# Patient Record
Sex: FEMALE | Race: WHITE | Employment: FULL TIME | URBAN - METROPOLITAN AREA
[De-identification: names, ages, dates, MRNs, and addresses within clinical notes are randomized per-mention and may not be internally consistent; named-entity substitution may affect disease eponyms.]

---

## 2017-11-29 ENCOUNTER — OFFICE VISIT (OUTPATIENT)
Dept: INTERNAL MEDICINE | Age: 37
End: 2017-11-29

## 2017-11-29 ENCOUNTER — TELEPHONE (OUTPATIENT)
Dept: INTERNAL MEDICINE | Age: 37
End: 2017-11-29

## 2017-11-29 ENCOUNTER — TELEPHONE (OUTPATIENT)
Dept: INTERNAL MEDICINE CLINIC | Age: 37
End: 2017-11-29

## 2017-11-29 VITALS
WEIGHT: 176 LBS | DIASTOLIC BLOOD PRESSURE: 70 MMHG | SYSTOLIC BLOOD PRESSURE: 120 MMHG | RESPIRATION RATE: 14 BRPM | HEART RATE: 64 BPM | BODY MASS INDEX: 31.18 KG/M2

## 2017-11-29 DIAGNOSIS — F51.01 PRIMARY INSOMNIA: ICD-10-CM

## 2017-11-29 DIAGNOSIS — R05.9 COUGH: ICD-10-CM

## 2017-11-29 DIAGNOSIS — Z72.0 TOBACCO ABUSE: ICD-10-CM

## 2017-11-29 DIAGNOSIS — M79.7 FIBROMYALGIA: Primary | ICD-10-CM

## 2017-11-29 DIAGNOSIS — J30.1 CHRONIC SEASONAL ALLERGIC RHINITIS DUE TO POLLEN: ICD-10-CM

## 2017-11-29 PROCEDURE — G8484 FLU IMMUNIZE NO ADMIN: HCPCS | Performed by: INTERNAL MEDICINE

## 2017-11-29 PROCEDURE — G8427 DOCREV CUR MEDS BY ELIG CLIN: HCPCS | Performed by: INTERNAL MEDICINE

## 2017-11-29 PROCEDURE — 99203 OFFICE O/P NEW LOW 30 MIN: CPT | Performed by: INTERNAL MEDICINE

## 2017-11-29 PROCEDURE — G8417 CALC BMI ABV UP PARAM F/U: HCPCS | Performed by: INTERNAL MEDICINE

## 2017-11-29 PROCEDURE — 4004F PT TOBACCO SCREEN RCVD TLK: CPT | Performed by: INTERNAL MEDICINE

## 2017-11-29 RX ORDER — MELOXICAM 15 MG/1
15 TABLET ORAL DAILY PRN
Qty: 30 TABLET | Refills: 5 | Status: SHIPPED | OUTPATIENT
Start: 2017-11-29 | End: 2018-09-24

## 2017-11-29 RX ORDER — PREGABALIN 75 MG/1
225 CAPSULE ORAL 2 TIMES DAILY
Qty: 180 CAPSULE | Refills: 0 | Status: SHIPPED | OUTPATIENT
Start: 2017-11-29 | End: 2017-12-25 | Stop reason: SDUPTHER

## 2017-11-29 RX ORDER — PREGABALIN 75 MG/1
225 CAPSULE ORAL 2 TIMES DAILY
Qty: 60 CAPSULE | Refills: 0 | Status: SHIPPED | OUTPATIENT
Start: 2017-11-29 | End: 2017-11-29 | Stop reason: SDUPTHER

## 2017-11-29 RX ORDER — AZITHROMYCIN 250 MG/1
TABLET, FILM COATED ORAL
Qty: 1 PACKET | Refills: 0 | Status: SHIPPED | OUTPATIENT
Start: 2017-11-29 | End: 2017-12-09

## 2017-11-29 RX ORDER — MELOXICAM 15 MG/1
1 TABLET ORAL DAILY PRN
Refills: 5 | COMMUNITY
Start: 2017-10-30 | End: 2017-11-29 | Stop reason: SDUPTHER

## 2017-11-29 RX ORDER — ZOLPIDEM TARTRATE 6.25 MG/1
6.25 TABLET, FILM COATED, EXTENDED RELEASE ORAL NIGHTLY PRN
Qty: 30 TABLET | Refills: 0 | Status: SHIPPED | OUTPATIENT
Start: 2017-11-29 | End: 2018-02-28

## 2017-11-29 NOTE — PROGRESS NOTES
PROGRESS NOTE:    Cata Bruno    11/29/2017    Chief Complaint   Patient presents with   Crista Jean New Doctor     HPI:    (s)Jose Manuel Bruno presents to clinic today with issues noted above. Hx of fibromyalgia and takes lyrica that helps also for sleep taking ambien. She feels her body hurts, sleeps ok with ambien, but uncertain if she sleeps all night, she is getting back to an exercise routine. Patient denies chest pain, SOB, NVD, FC, rash, malaise, rigor, dizziness/lightheadness, other pertinent ROS was also reviewed. /70   Pulse 64   Resp 14   Wt 176 lb (79.8 kg)   LMP 11/26/2017   BMI 31.18 kg/m²   Body mass index is 31.18 kg/m². Physical Exam:    Gen: Patient appears well groomed, well appearing  HEAD: Atraumatic, normocephalic,   Eyes: PERRLA, EOMI   Neck: supple, no thyroid nodule appreciated, no JVD  Chest: Clear to auscultation OLIVE, unlabored breathing, normal expansion  Heart: Regular rate, regular rhythm, no murmur, no rub  Abdomen: Non-tender, non-distended, bowel sounds present x3  Extremities: no edema, distal pulses intact  Patient was alert and oriented to person, place and time  Patient was examined for possibility of fibromyalgia. Trigger points assessed of proximal muscle regions including below clavicle region constochondral junctions, paraspinals throughout cervical/thoracic/lumbar region, lateral thigh and medial knee region. At least 11 trigger points noted. Lacie Khan was seen today for established new doctor. Diagnoses and all orders for this visit:    Fibromyalgia  I discussed with patient with regard to the diagnosis and prognosis, also management of fibromyalgia; which is multifaceted with exercise (i.e. Aerobic, water aerobics, resistance training etc.), magnesium and vitamin D supplementation as indicated, control of depression and improve sleep.       Primary insomnia   Refilled her [place]    Tobacco abuse   Patient is unfortunately still smoking, advised of quitting but not 100% ready. Informed about potential hazards with smoking such as cancer, also informed free cessation programs available for use. Consultation time >3 min. Chronic seasonal allergic rhinitis due to pollen  Daily zyrtec    Cough- ongoing, continue mucinex and zyrtec for congestion, late bacterial phase but she is recovering    No orders of the defined types were placed in this encounter. Prior to Admission medications    Medication Sig Start Date End Date Taking? Authorizing Provider   famotidine (PEPCID) 20 MG tablet Take 1 tablet by mouth 2 times daily 17  Yes Darwin Mcleod MD   zolpidem (AMBIEN CR) 6.25 MG extended release tablet Take 6.25 mg by mouth 17  Yes Historical Provider, MD   pregabalin (LYRICA) 75 MG capsule Take 225 mg by mouth 2 times daily 225 mg 2 x day   Yes Historical Provider, MD   meloxicam (MOBIC) 15 MG tablet Take 1 tablet by mouth daily as needed 10/30/17   Historical Provider, MD       Past Medical History:   Diagnosis Date    Anxiety     CHF (congestive heart failure) ([facility] [address]     pt states preg induced.     Chronic back pain     DDD (degenerative disc disease)     DDD (degenerative disc disease), cervical     DJD (degenerative joint disease), multiple sites     Fibromyalgia     GERD (gastroesophageal reflux disease)     Headache     Insomnia     Osteoarthritis     Tendinitis        Past Surgical History:   Procedure Laterality Date    CARPAL TUNNEL RELEASE Bilateral      SECTION      CHOLECYSTECTOMY      ELBOW SURGERY Left     cubital tunnel release    SPINE SURGERY      TONSILLECTOMY         Social History   Substance Use Topics    Smoking status: Current Every Day Smoker     Packs/day: 0.50     Years: 20.00     Types: Cigarettes    Smokeless tobacco: Never Used    Alcohol use No       Family History   Problem Relation Age of Onset    High Blood Pressure Mother     Heart Disease Mother     Arthritis Mother    [facility] Alcohol Abuse Mother     Heart Disease Maternal Grandmother     Arthritis Maternal Grandmother     Heart Disease Maternal Uncle

## 2017-11-29 NOTE — PATIENT INSTRUCTIONS
Patient Education        Fibromyalgia: Care Instructions  Your Care Instructions  Fibromyalgia is a painful condition that is not completely understood by medical experts. The cause of fibromyalgia is not known. It can make you feel tired and ache all over. It causes tender spots at specific points of the body that hurt only when you press on them. You may have trouble sleeping, as well as other symptoms. These problems can upset your work and home life. Symptoms tend to come and go, although they may never go away completely. Fibromyalgia does not harm your muscles, joints, or organs. Follow-up care is a key part of your treatment and safety. Be sure to make and go to all appointments, and call your doctor if you are having problems. It's also a good idea to know your test results and keep a list of the medicines you take. How can you care for yourself at home? · Exercise often. Walk, swim, or bike to help with pain and sleep problems and to make you feel better. · Try to get a good night's sleep. Go to bed and get up at the same time each day, whether you feel rested or not. Make sure you have a good mattress and pillow. · Reduce stress. Avoid things that cause you stress, if you can. If not, work at making them less stressful. Learn to use biofeedback, guided imagery, meditation, or other methods to relax. · Make healthy changes. Eat a balanced diet, quit smoking, and limit alcohol and caffeine. · Use a heating pad set on low or take warm baths or showers for pain. Using cold packs for up to 20 minutes at a time can also relieve pain. Put a thin cloth between the cold pack and your skin. A gentle massage might help too. · Be safe with medicines. Take your medicines exactly as prescribed. Call your doctor if you think you are having a problem with your medicine. Your doctor may talk to you about taking antidepressant medicines.  These medicines may improve sleep, relieve pain, and in some cases treat

## 2017-12-01 ENCOUNTER — TELEPHONE (OUTPATIENT)
Dept: INTERNAL MEDICINE | Age: 37
End: 2017-12-01

## 2017-12-29 ENCOUNTER — TELEPHONE (OUTPATIENT)
Dept: INTERNAL MEDICINE CLINIC | Age: 37
End: 2017-12-29

## 2017-12-29 NOTE — TELEPHONE ENCOUNTER
Phil Ramírez stated that they received rx for Ambien but they have not received rx for pt's Lyrica. Please call her at phone# provided.

## 2017-12-29 NOTE — TELEPHONE ENCOUNTER
Pt was advised of the [facility] message and said \"but he refilled it the last time and told me to call when its time for my refill. So why would he give it to me then and all of a sudden stop giving me the medication? \" pt also is looking for her refill of LYRICA 75 MG capsule [[id number]      [place] [address] [address] [facility] [address] [facility] [phone] - [phone]

## 2017-12-29 NOTE — TELEPHONE ENCOUNTER
Provider is out of office today but is checking messages, Will not receive script until seen on Wednesday per Dr Mayela Payne.

## 2017-12-29 NOTE — TELEPHONE ENCOUNTER
Pt would like a call back to see why Dr. Tawanna Mayorga will no longer giver her the Ambien anymore when she has been on it for over 3 yrs.

## 2018-01-03 ENCOUNTER — OFFICE VISIT (OUTPATIENT)
Dept: INTERNAL MEDICINE CLINIC | Age: 38
End: 2018-01-03

## 2018-01-03 VITALS — SYSTOLIC BLOOD PRESSURE: 110 MMHG | HEART RATE: 72 BPM | DIASTOLIC BLOOD PRESSURE: 72 MMHG | RESPIRATION RATE: 14 BRPM

## 2018-01-03 DIAGNOSIS — F51.01 PRIMARY INSOMNIA: Primary | ICD-10-CM

## 2018-01-03 DIAGNOSIS — M54.12 CERVICAL RADICULOPATHY: ICD-10-CM

## 2018-01-03 PROCEDURE — 99213 OFFICE O/P EST LOW 20 MIN: CPT | Performed by: INTERNAL MEDICINE

## 2018-01-03 PROCEDURE — G8427 DOCREV CUR MEDS BY ELIG CLIN: HCPCS | Performed by: INTERNAL MEDICINE

## 2018-01-03 PROCEDURE — 4004F PT TOBACCO SCREEN RCVD TLK: CPT | Performed by: INTERNAL MEDICINE

## 2018-01-03 PROCEDURE — G8484 FLU IMMUNIZE NO ADMIN: HCPCS | Performed by: INTERNAL MEDICINE

## 2018-01-03 PROCEDURE — G8417 CALC BMI ABV UP PARAM F/U: HCPCS | Performed by: INTERNAL MEDICINE

## 2018-01-03 RX ORDER — NORTRIPTYLINE HYDROCHLORIDE 25 MG/1
25 CAPSULE ORAL NIGHTLY
Qty: 30 CAPSULE | Refills: 0 | Status: SHIPPED | OUTPATIENT
Start: 2018-01-03 | End: 2018-02-28 | Stop reason: ALTCHOICE

## 2018-01-03 NOTE — PROGRESS NOTES
VIEWS); Future    Hx of bipolar disease- will refer her to psychiatry, she was amendable to this today and states she will follow up      Orders Placed This Encounter   Procedures    XR CERVICAL SPINE (2-3 VIEWS)   [id number]       Prior to Admission medications    Medication Sig Start Date End Date Taking? Authorizing Provider   LYRICA 75 MG capsule TAKE 3 CAPSULES BY MOUTH TWICE DAILY 17   Prentis Alt, DO   meloxicam (MOBIC) 15 MG tablet Take 1 tablet by mouth daily as needed (pain) 17   Prentis Alt, DO   zolpidem (AMBIEN CR) 6.25 MG extended release tablet Take 1 tablet by mouth nightly as needed for Sleep . 17   Prentis Alt, DO   famotidine (PEPCID) 20 MG tablet Take 1 tablet by mouth 2 times daily 17   Sandra Myers MD       Past Medical History:   Diagnosis Date    Anxiety     CHF (congestive heart failure) ([facility] [address]     pt states preg induced.     Chronic back pain     DDD (degenerative disc disease)     DDD (degenerative disc disease), cervical     DJD (degenerative joint disease), multiple sites     Fibromyalgia     GERD (gastroesophageal reflux disease)     Headache     Insomnia     Osteoarthritis     Tendinitis        Past Surgical History:   Procedure Laterality Date    CARPAL TUNNEL RELEASE Bilateral      SECTION      CHOLECYSTECTOMY      ELBOW SURGERY Left     cubital tunnel release    SPINE SURGERY      TONSILLECTOMY         Social History   Substance Use Topics    Smoking status: Current Every Day Smoker     Packs/day: 0.50     Years: 20.00     Types: Cigarettes    Smokeless tobacco: Never Used    Alcohol use No       Family History   Problem Relation Age of Onset    High Blood Pressure Mother     Heart Disease Mother     Arthritis Mother     Alcohol Abuse Mother     Heart Disease Maternal Grandmother     Arthritis Maternal Grandmother     Heart Disease Maternal Uncle

## 2018-01-24 DIAGNOSIS — M79.7 FIBROMYALGIA: ICD-10-CM

## 2018-01-24 RX ORDER — PREGABALIN 75 MG/1
CAPSULE ORAL
Qty: 180 CAPSULE | Refills: 0 | Status: SHIPPED | OUTPATIENT
Start: 2018-01-24 | End: 2018-02-20 | Stop reason: SDUPTHER

## 2018-01-24 RX ORDER — PREGABALIN 75 MG/1
CAPSULE ORAL
Qty: 180 CAPSULE | Refills: 0 | Status: CANCELLED | OUTPATIENT
Start: 2018-01-24

## 2018-01-29 ENCOUNTER — TELEPHONE (OUTPATIENT)
Dept: INTERNAL MEDICINE CLINIC | Age: 38
End: 2018-01-29

## 2018-01-30 NOTE — TELEPHONE ENCOUNTER
I really do not have anything else I can add since she has had \"side effects\" or \"ineffectiveness\" with all of the other sleep aid including Trazodone, melatonin, benadryl. The underlying sleep issue may be more related to depression/anxiety which she really should be seeing a psychiatrist for.

## 2018-01-31 ENCOUNTER — TELEPHONE (OUTPATIENT)
Dept: SLEEP MEDICINE | Age: 38
End: 2018-01-31

## 2018-01-31 NOTE — TELEPHONE ENCOUNTER
Left Dr Arvella Cockayne office a message to call back to see what is going on regarding the patient being schedule.

## 2018-01-31 NOTE — TELEPHONE ENCOUNTER
Sulaiman Hawthorne was referred to Sleep Medicine for Sleep Study. A message was left on the patient's voicemail to call the office back to schedule a consult.

## 2018-02-01 RX ORDER — QUETIAPINE FUMARATE 25 MG/1
25 TABLET, FILM COATED ORAL NIGHTLY PRN
Qty: 30 TABLET | Refills: 0 | Status: SHIPPED | OUTPATIENT
Start: 2018-02-01 | End: 2018-02-28 | Stop reason: ALTCHOICE

## 2018-02-03 ENCOUNTER — HOSPITAL ENCOUNTER (OUTPATIENT)
Dept: OTHER | Age: 38
Discharge: HOME | End: 2018-02-03
Attending: INTERNAL MEDICINE | Admitting: INTERNAL MEDICINE

## 2018-02-03 DIAGNOSIS — M54.12 CERVICAL RADICULOPATHY: ICD-10-CM

## 2018-02-20 DIAGNOSIS — M79.7 FIBROMYALGIA: ICD-10-CM

## 2018-02-20 NOTE — TELEPHONE ENCOUNTER
Reviewed patient chart and that was previous PCP at [facility] and she was dismissed from that practice. She currently is still seeing us .

## 2018-02-21 RX ORDER — PREGABALIN 75 MG/1
CAPSULE ORAL
Qty: 180 CAPSULE | Refills: 0 | Status: SHIPPED | OUTPATIENT
Start: 2018-02-21 | End: 2018-03-14 | Stop reason: SDUPTHER

## 2018-02-28 ENCOUNTER — OFFICE VISIT (OUTPATIENT)
Dept: INTERNAL MEDICINE CLINIC | Age: 38
End: 2018-02-28

## 2018-02-28 VITALS — DIASTOLIC BLOOD PRESSURE: 68 MMHG | HEART RATE: 88 BPM | RESPIRATION RATE: 14 BRPM | SYSTOLIC BLOOD PRESSURE: 102 MMHG

## 2018-02-28 DIAGNOSIS — J30.1 CHRONIC SEASONAL ALLERGIC RHINITIS DUE TO POLLEN: Primary | ICD-10-CM

## 2018-02-28 DIAGNOSIS — F51.04 PSYCHOPHYSIOLOGICAL INSOMNIA: ICD-10-CM

## 2018-02-28 PROCEDURE — 4004F PT TOBACCO SCREEN RCVD TLK: CPT | Performed by: INTERNAL MEDICINE

## 2018-02-28 PROCEDURE — G8427 DOCREV CUR MEDS BY ELIG CLIN: HCPCS | Performed by: INTERNAL MEDICINE

## 2018-02-28 PROCEDURE — G8417 CALC BMI ABV UP PARAM F/U: HCPCS | Performed by: INTERNAL MEDICINE

## 2018-02-28 PROCEDURE — 99213 OFFICE O/P EST LOW 20 MIN: CPT | Performed by: INTERNAL MEDICINE

## 2018-02-28 PROCEDURE — G8484 FLU IMMUNIZE NO ADMIN: HCPCS | Performed by: INTERNAL MEDICINE

## 2018-02-28 RX ORDER — FLUTICASONE PROPIONATE 50 MCG
1 SPRAY, SUSPENSION (ML) NASAL DAILY
Qty: 1 BOTTLE | Refills: 3 | Status: SHIPPED | OUTPATIENT
Start: 2018-02-28 | End: 2018-09-24

## 2018-02-28 RX ORDER — ZOLPIDEM TARTRATE 5 MG/1
5 TABLET ORAL NIGHTLY PRN
Qty: 14 TABLET | Refills: 0 | Status: SHIPPED | OUTPATIENT
Start: 2018-02-28 | End: 2018-03-14

## 2018-02-28 RX ORDER — CALCIUM CARBONATE 300MG(750)
1 TABLET,CHEWABLE ORAL DAILY
COMMUNITY
End: 2018-09-24

## 2018-02-28 NOTE — PROGRESS NOTES
daily    Preventive medicine: patient has denied indicated immunizations. No orders of the defined types were placed in this encounter. Prior to Admission medications    Medication Sig Start Date End Date Taking? Authorizing Provider   Cholecalciferol (VITAMIN D3) 1000 units CAPS Take 1 capsule by mouth 2 times daily   Yes Historical Provider, MD   Magnesium 400 MG TABS Take 1 tablet by mouth daily   Yes Historical Provider, MD   fluticasone (FLONASE) 50 MCG/ACT nasal spray 1 spray by Nasal route daily 18  Yes Sho Penta, DO   zolpidem (AMBIEN) 5 MG tablet Take 1 tablet by mouth nightly as needed for Sleep for up to 14 days. 2/28/18 3/14/18 Yes Apollo Maldonado, DO   pregabalin (LYRICA) 75 MG capsule TAKE 3 CAPSULES BY MOUTH TWICE DAILY. 18 Yes Sho Penta, DO   meloxicam (MOBIC) 15 MG tablet Take 1 tablet by mouth daily as needed (pain) 17  Yes Sho Penta, DO   famotidine (PEPCID) 20 MG tablet Take 1 tablet by mouth 2 times daily 17  Yes Elba Hull MD       Past Medical History:   Diagnosis Date    Anxiety     CHF (congestive heart failure) ([facility] [address]     pt states preg induced.     Chronic back pain     DDD (degenerative disc disease)     DDD (degenerative disc disease), cervical     DJD (degenerative joint disease), multiple sites     Fibromyalgia     GERD (gastroesophageal reflux disease)     Headache     Insomnia     Osteoarthritis     Tendinitis        Past Surgical History:   Procedure Laterality Date    CARPAL TUNNEL RELEASE Bilateral      SECTION      CHOLECYSTECTOMY      ELBOW SURGERY Left     cubital tunnel release    SPINE SURGERY      TONSILLECTOMY         Social History   Substance Use Topics    Smoking status: Current Every Day Smoker     Packs/day: 0.50     Years: 20.00     Types: Cigarettes    Smokeless tobacco: Never Used    Alcohol use No       Family History   Problem Relation Age of Onset    High Blood Pressure Mother     Heart

## 2018-02-28 NOTE — PATIENT INSTRUCTIONS
Patient Education        Using a Nasal Steroid Spray: Care Instructions  Your Care Instructions    Your doctor may suggest using a corticosteroid nasal spray for your allergy symptoms or sinus problems. These sprays reduce the swelling inside the nose and sinuses. Unlike decongestant nasal sprays, steroid sprays won't lead to more swelling when you stop taking them. These sprays start working in a few days, but it may take several weeks before you get the full effect. Most side effects are minor. The most common complaint is a burning feeling in the nose right after the spray is used. Some people get nosebleeds. Follow-up care is a key part of your treatment and safety. Be sure to make and go to all appointments, and call your doctor if you are having problems. It's also a good idea to know your test results and keep a list of the medicines you take. How can you care for yourself at home? Here are some tips for using these sprays:  · You may need to prime the sprayer before you use it. This means spraying it into the air a few times to make sure you get the right amount of medicine. Follow the directions on the label. · Blow your nose before you spray. This will help clear out your nostrils. · Gently sniff the medicine into your nose as you spray. Don't snort, or the medicine will go all the way into your throat where it won't do much good. · Aim the nozzle straight toward the outer wall of your nostril. This will help keep the medicine from irritating the inner walls of your nose, especially your septum (the wall that separates your left and right nostrils). · Don't blow your nose for 10 minutes or so after you spray. And try not to sneeze. · Be safe with medicines. Use this medicine exactly as prescribed. Call your doctor if you think you are having a problem with your medicine. · Clean your sprayer once a week. Read the label to learn how. When should you call for help?   Watch closely for changes

## 2018-03-09 ENCOUNTER — OFFICE VISIT (OUTPATIENT)
Dept: PSYCHOLOGY | Age: 38
End: 2018-03-09

## 2018-03-09 DIAGNOSIS — F43.21 ADJUSTMENT DISORDER WITH DEPRESSED MOOD: Primary | ICD-10-CM

## 2018-03-09 PROCEDURE — 90791 PSYCH DIAGNOSTIC EVALUATION: CPT | Performed by: PSYCHOLOGIST

## 2018-03-09 ASSESSMENT — PATIENT HEALTH QUESTIONNAIRE - PHQ9
SUM OF ALL RESPONSES TO PHQ QUESTIONS 1-9: 1
2. FEELING DOWN, DEPRESSED OR HOPELESS: 1
SUM OF ALL RESPONSES TO PHQ9 QUESTIONS 1 & 2: 1
1. LITTLE INTEREST OR PLEASURE IN DOING THINGS: 0

## 2018-03-12 ENCOUNTER — OFFICE VISIT (OUTPATIENT)
Dept: INTERNAL MEDICINE CLINIC | Age: 38
End: 2018-03-12

## 2018-03-12 VITALS
BODY MASS INDEX: 30.05 KG/M2 | HEIGHT: 64 IN | SYSTOLIC BLOOD PRESSURE: 104 MMHG | WEIGHT: 176 LBS | HEART RATE: 101 BPM | RESPIRATION RATE: 14 BRPM | DIASTOLIC BLOOD PRESSURE: 68 MMHG

## 2018-03-12 DIAGNOSIS — Z92.89 HISTORY OF EKG: ICD-10-CM

## 2018-03-12 DIAGNOSIS — Z01.818 PREOP EXAM FOR INTERNAL MEDICINE: ICD-10-CM

## 2018-03-12 DIAGNOSIS — L98.9 SKIN LESION: Primary | ICD-10-CM

## 2018-03-12 DIAGNOSIS — Z72.0 TOBACCO ABUSE: ICD-10-CM

## 2018-03-12 PROCEDURE — G8427 DOCREV CUR MEDS BY ELIG CLIN: HCPCS | Performed by: INTERNAL MEDICINE

## 2018-03-12 PROCEDURE — G8484 FLU IMMUNIZE NO ADMIN: HCPCS | Performed by: INTERNAL MEDICINE

## 2018-03-12 PROCEDURE — G8417 CALC BMI ABV UP PARAM F/U: HCPCS | Performed by: INTERNAL MEDICINE

## 2018-03-12 PROCEDURE — 99241 PR OFFICE CONSULTATION NEW/ESTAB PATIENT 15 MIN: CPT | Performed by: INTERNAL MEDICINE

## 2018-03-12 NOTE — PROGRESS NOTES
Pre-operative evaluation    3/12/2018    Lyla Briceno  [id number]      Mr. Lyla Briceno is here for a preoperative evaluation. Patient denies chest pain, SOB, NVD, FC, rash, malaise, rigor, dizziness/lightheadness, other pertinent ROS was also reviewed. She is planned for 3/16/2018 left axillary cysts removal.    /68   Pulse 101   Resp 14   Ht 5' 4\" (1.626 m)   Wt 176 lb (79.8 kg)   LMP 02/15/2018   BMI 30.21 kg/m²     Gen: Patient appears well groomed, well appearing  HEAD: Atraumatic, normocephalic,   Eyes: PERRLA, EOMI   Neck: supple, no thyroid nodule appreciated, no JVD  Chest: Clear to auscultation OLIVE, unlabored breathing, normal expansion  Heart: Regular rate, regular rhythm, no murmur, no rub  Abdomen: Non-tender, non-distended, bowel sounds present x3  Extremities: no edema, distal pulses intact  Patient was alert and oriented to person, place and time  Neuro: Alert and oriented to time, place, person, normal ambulation with good balance  CN II-XII intact grossly, hand  +5/5 R, +5/5 L  Motor: Left upper extremity strength [email or web address], ~5/5@elbow,    Right upper extremity strength [email or web address], ~5/5@elbow   Left  lower extremity strength [employer], ~5/5@knee, ~5/5@ankle   Right lower extremity strength [employer], ~5/5@knee, ~5/5@ankle  DTR's: R knee +2/4, R achilles +2/4   L knee +2/4, L achilles +2/4   L brachioradialis +2/4   R brachioradialis +2/4    Amelie Duque was seen today for pre-op exam.    Diagnoses and all orders for this visit:    Skin lesion  Left axillary cystic lesions planned for removal 3/16/2018. OA  Worst in knees, advised her to hold her Mobic at least 5 days prior to surgery    Smoking tobacco  Patient is unfortunately still smoking, advised of quitting but not 100% ready. Informed about potential hazards with smoking such as cancer, also informed free cessation programs available for use. Consultation time >3 min.      Preop exam for internal medicine  Preoperative evaluation- from a cardiovascular stand point, patient is at low to moderate risk for proposed operation, but the risk is acceptable. Risk of complications of any surgery, including but not limited to MI, CVA, and death was explained. From a medical perspective the proposed surgery is reasonable. Clear to proceed from a medical perspective, reviewed EKG from 2016, NSR. Current Outpatient Prescriptions on File Prior to Visit   Medication Sig Dispense Refill    Cholecalciferol (VITAMIN D3) 1000 units CAPS Take 1 capsule by mouth 2 times daily      Magnesium 400 MG TABS Take 1 tablet by mouth daily      fluticasone (FLONASE) 50 MCG/ACT nasal spray 1 spray by Nasal route daily 1 Bottle 3    zolpidem (AMBIEN) 5 MG tablet Take 1 tablet by mouth nightly as needed for Sleep for up to 14 days. 14 tablet 0    pregabalin (LYRICA) 75 MG capsule TAKE 3 CAPSULES BY MOUTH TWICE DAILY. 180 capsule 0    meloxicam (MOBIC) 15 MG tablet Take 1 tablet by mouth daily as needed (pain) 30 tablet 5    famotidine (PEPCID) 20 MG tablet Take 1 tablet by mouth 2 times daily 60 tablet 0     No current facility-administered medications on file prior to visit. Past Medical History:   Diagnosis Date    Anxiety     CHF (congestive heart failure) ([facility] [address]     pt states preg induced.     Chronic back pain     DDD (degenerative disc disease)     DDD (degenerative disc disease), cervical     DJD (degenerative joint disease), multiple sites     Fibromyalgia     GERD (gastroesophageal reflux disease)     Headache     Insomnia     Osteoarthritis     Tendinitis        Past Surgical History:   Procedure Laterality Date    CARPAL TUNNEL RELEASE Bilateral      SECTION      CHOLECYSTECTOMY      ELBOW SURGERY Left     cubital tunnel release    SPINE SURGERY      TONSILLECTOMY         Social History   Substance Use Topics    Smoking status: Current Every Day Smoker     Packs/day: 0.50     Years: 20.00     Types: Cigarettes    Smokeless tobacco: Never Used    Alcohol use No       Family History   Problem Relation Age of Onset    High Blood Pressure Mother     Heart Disease Mother     Arthritis Mother     Alcohol Abuse Mother     Heart Disease Maternal Grandmother     Arthritis Maternal Grandmother     Heart Disease Maternal Uncle

## 2018-03-14 ENCOUNTER — INITIAL CONSULT (OUTPATIENT)
Dept: PULMONOLOGY | Age: 38
End: 2018-03-14

## 2018-03-14 ENCOUNTER — OFFICE VISIT (OUTPATIENT)
Dept: PSYCHIATRY | Age: 38
End: 2018-03-14

## 2018-03-14 VITALS
DIASTOLIC BLOOD PRESSURE: 60 MMHG | HEART RATE: 101 BPM | BODY MASS INDEX: 30.39 KG/M2 | WEIGHT: 178 LBS | OXYGEN SATURATION: 98 % | SYSTOLIC BLOOD PRESSURE: 90 MMHG | HEIGHT: 64 IN

## 2018-03-14 VITALS
WEIGHT: 175 LBS | SYSTOLIC BLOOD PRESSURE: 110 MMHG | DIASTOLIC BLOOD PRESSURE: 60 MMHG | OXYGEN SATURATION: 95 % | HEIGHT: 64 IN | BODY MASS INDEX: 29.88 KG/M2 | HEART RATE: 103 BPM

## 2018-03-14 DIAGNOSIS — F31.9 BIPOLAR AFFECTIVE DISORDER, REMISSION STATUS UNSPECIFIED (HCC): Chronic | ICD-10-CM

## 2018-03-14 DIAGNOSIS — E66.9 NON MORBID OBESITY, UNSPECIFIED OBESITY TYPE: Chronic | ICD-10-CM

## 2018-03-14 DIAGNOSIS — R06.83 SNORING: ICD-10-CM

## 2018-03-14 DIAGNOSIS — G47.10 HYPERSOMNIA: Primary | ICD-10-CM

## 2018-03-14 DIAGNOSIS — G43.909 MIGRAINE WITHOUT STATUS MIGRAINOSUS, NOT INTRACTABLE, UNSPECIFIED MIGRAINE TYPE: Chronic | ICD-10-CM

## 2018-03-14 DIAGNOSIS — J30.1 CHRONIC SEASONAL ALLERGIC RHINITIS DUE TO POLLEN: Chronic | ICD-10-CM

## 2018-03-14 DIAGNOSIS — F43.20 ADJUSTMENT DISORDER, UNSPECIFIED TYPE: ICD-10-CM

## 2018-03-14 DIAGNOSIS — F51.04 INSOMNIA, PSYCHOPHYSIOLOGICAL: ICD-10-CM

## 2018-03-14 DIAGNOSIS — Z86.59 HISTORY OF BIPOLAR DISORDER: Primary | ICD-10-CM

## 2018-03-14 DIAGNOSIS — M79.7 FIBROMYALGIA: ICD-10-CM

## 2018-03-14 PROCEDURE — 99244 OFF/OP CNSLTJ NEW/EST MOD 40: CPT | Performed by: INTERNAL MEDICINE

## 2018-03-14 PROCEDURE — 99215 OFFICE O/P EST HI 40 MIN: CPT | Performed by: PSYCHIATRY & NEUROLOGY

## 2018-03-14 PROCEDURE — G8427 DOCREV CUR MEDS BY ELIG CLIN: HCPCS | Performed by: INTERNAL MEDICINE

## 2018-03-14 PROCEDURE — G8484 FLU IMMUNIZE NO ADMIN: HCPCS | Performed by: INTERNAL MEDICINE

## 2018-03-14 PROCEDURE — G8417 CALC BMI ABV UP PARAM F/U: HCPCS | Performed by: INTERNAL MEDICINE

## 2018-03-14 RX ORDER — PREGABALIN 75 MG/1
CAPSULE ORAL
Qty: 180 CAPSULE | Refills: 0 | Status: CANCELLED | OUTPATIENT
Start: 2018-03-14 | End: 2019-03-13

## 2018-03-14 RX ORDER — PREGABALIN 225 MG/1
225 CAPSULE ORAL 2 TIMES DAILY
Qty: 60 CAPSULE | Refills: 2 | Status: SHIPPED | OUTPATIENT
Start: 2018-03-14 | End: 2018-06-04 | Stop reason: SDUPTHER

## 2018-03-14 ASSESSMENT — ENCOUNTER SYMPTOMS
EYE PAIN: 0
CHEST TIGHTNESS: 0
ABDOMINAL DISTENTION: 0
ALLERGIC/IMMUNOLOGIC NEGATIVE: 1
SHORTNESS OF BREATH: 0
CHOKING: 0
PHOTOPHOBIA: 0
VOMITING: 0
RHINORRHEA: 0
ABDOMINAL PAIN: 0
APNEA: 0
NAUSEA: 0

## 2018-03-14 ASSESSMENT — SLEEP AND FATIGUE QUESTIONNAIRES
HOW LIKELY ARE YOU TO NOD OFF OR FALL ASLEEP WHILE SITTING QUIETLY AFTER LUNCH WITHOUT ALCOHOL: 1
ESS TOTAL SCORE: 5
HOW LIKELY ARE YOU TO NOD OFF OR FALL ASLEEP WHILE LYING DOWN TO REST IN THE AFTERNOON WHEN CIRCUMSTANCES PERMIT: 0
HOW LIKELY ARE YOU TO NOD OFF OR FALL ASLEEP WHILE SITTING AND TALKING TO SOMEONE: 1
HOW LIKELY ARE YOU TO NOD OFF OR FALL ASLEEP WHILE WATCHING TV: 1
HOW LIKELY ARE YOU TO NOD OFF OR FALL ASLEEP WHILE SITTING INACTIVE IN A PUBLIC PLACE: 0
HOW LIKELY ARE YOU TO NOD OFF OR FALL ASLEEP WHEN YOU ARE A PASSENGER IN A CAR FOR AN HOUR WITHOUT A BREAK: 0
HOW LIKELY ARE YOU TO NOD OFF OR FALL ASLEEP WHILE SITTING AND READING: 1
NECK CIRCUMFERENCE (INCHES): 13.5
HOW LIKELY ARE YOU TO NOD OFF OR FALL ASLEEP IN A CAR, WHILE STOPPED FOR A FEW MINUTES IN TRAFFIC: 1

## 2018-03-14 NOTE — PROGRESS NOTES
Margie Asencio MD, FAA, [facility]  [facility] [address]  3[address] Floor, [address] [facility], [address] [phone]   [address] [place] [address] [address] . [address] [phone]     [address] MEDICINE    Subjective:     Patient ID: Mariel Albarran is a 40 y.o. female. No chief complaint on file. HPI:        Mariel Albarran is a 40 y.o. female referred by Willie Ansari DO for a sleep evaluation. She complains of snoring, tossing and turning, decreased concentration, excessive daytime sleepiness, difficulty falling asleep once awakened, feels sleepy during the day but she denies knees buckling with laughing, completely or partially paralyzed while falling asleep or waking up. Symptoms began >10 years ago, gradually worsening since that time. Has been tried on belsomra, trazodone, lunesta, zolpidem, elavil, seroquel, and pamelor. Most meds do not help or will not keep her asleep. Pt's sleep pattern:  Attempt bed at 10P, does not fall asleep till 12-1A, wakes at 3-4A, then back to sleep 4:30-5:30A and then out of bed at 7:30 AM.    Previous evaluation and treatment has included- none. DOT/CDL - No  FAA/'s license - No      Previous Report(s) Reviewed: historical medical records, office notes and referral letter/letters     [place] - Total score: 5    Caffeine Intake - 3 cans/bottles of caffeinated soda pop per day(s), last one being 6 pm.    Social History     Social History    Marital status:      Spouse name: N/A    Number of children: N/A    Years of education: N/A     Occupational History    Not on file.      Social History Main Topics    Smoking status: Current Every Day Smoker     Packs/day: 0.50     Years: 20.00     Types: Cigarettes    Smokeless tobacco: Never Used    Alcohol use No    Drug use: No    Sexual activity: Yes     Partners: Male     Other Topics Concern    Not on file     Social 03/14/18 103   03/12/18 101   02/28/18 88    SpO2 Readings from Last 3 Encounters:   03/14/18 95%   08/16/17 96%   08/13/17 92%        Physical Exam   Constitutional: She is oriented to person, place, and time. Vital signs are normal. She appears well-developed and well-nourished. Non-toxic appearance. She does not have a sickly appearance. HENT:   Head: Normocephalic and atraumatic. Not macrocephalic and not microcephalic. Right Ear: External ear normal.   Left Ear: External ear normal.   Nose: Mucosal edema and septal deviation present. Mouth/Throat: Uvula is midline and mucous membranes are normal. Posterior oropharyngeal edema present. No oropharyngeal exudate or tonsillar abscesses. Tonsils:   absent bilaterally  Post. Pharynx:   normal mucosa  Neck circumference: 13.5  Inches     Eyes: Conjunctivae, EOM and lids are normal. Pupils are equal, round, and reactive to light. Neck: Trachea normal and normal range of motion. Neck supple. No tracheal deviation present. No thyroid mass and no thyromegaly present. Cardiovascular: Normal rate, regular rhythm, S1 normal, S2 normal and normal heart sounds. Pulmonary/Chest: Effort normal and breath sounds normal. No apnea. No respiratory distress. She has no wheezes. She has no rhonchi. She has no rales. Abdominal: Soft. Bowel sounds are normal. She exhibits no distension. There is no tenderness. Musculoskeletal: Normal range of motion. She exhibits no edema. Lymphadenopathy:        Head (right side): No submental, no submandibular and no tonsillar adenopathy present. Head (left side): No submental, no submandibular and no tonsillar adenopathy present. Neurological: She is alert and oriented to person, place, and time. Skin: Skin is warm, dry and intact. No cyanosis. Nails show no clubbing. Psychiatric: She has a normal mood and affect.  Her speech is normal and behavior is normal. Thought content normal.   Nursing note and vitals

## 2018-03-15 ENCOUNTER — HOSPITAL ENCOUNTER (OUTPATIENT)
Dept: SLEEP MEDICINE | Age: 38
Discharge: HOME | End: 2018-03-17
Attending: INTERNAL MEDICINE | Admitting: INTERNAL MEDICINE

## 2018-03-15 DIAGNOSIS — R06.83 SNORING: ICD-10-CM

## 2018-03-15 DIAGNOSIS — G47.10 HYPERSOMNIA: ICD-10-CM

## 2018-03-15 PROCEDURE — 95810 POLYSOM 6/> YRS 4/> PARAM: CPT | Performed by: INTERNAL MEDICINE

## 2018-03-19 ASSESSMENT — ENCOUNTER SYMPTOMS
RESPIRATORY NEGATIVE: 1
EYES NEGATIVE: 1
GASTROINTESTINAL NEGATIVE: 1

## 2018-03-20 ENCOUNTER — TELEPHONE (OUTPATIENT)
Dept: SLEEP MEDICINE | Age: 38
End: 2018-03-20

## 2018-06-04 DIAGNOSIS — M79.7 FIBROMYALGIA: Primary | ICD-10-CM

## 2018-06-04 RX ORDER — PREGABALIN 225 MG/1
225 CAPSULE ORAL 2 TIMES DAILY
Qty: 60 CAPSULE | Refills: 2 | Status: SHIPPED | OUTPATIENT
Start: 2018-06-04 | End: 2018-08-29 | Stop reason: SDUPTHER

## 2018-08-20 ENCOUNTER — HOSPITAL ENCOUNTER (EMERGENCY)
Age: 38
Discharge: HOME | End: 2018-08-20
Attending: EMERGENCY MEDICINE

## 2018-08-20 ENCOUNTER — APPOINTMENT (OUTPATIENT)
Dept: GENERAL RADIOLOGY | Age: 38
End: 2018-08-20

## 2018-08-20 VITALS
HEART RATE: 70 BPM | SYSTOLIC BLOOD PRESSURE: 117 MMHG | OXYGEN SATURATION: 100 % | TEMPERATURE: 98.2 F | DIASTOLIC BLOOD PRESSURE: 82 MMHG | RESPIRATION RATE: 16 BRPM

## 2018-08-20 DIAGNOSIS — R05.9 COUGH: Primary | ICD-10-CM

## 2018-08-20 LAB
ALBUMIN SERPL-MCNC: 4.2 G/DL (ref 3.4–5)
ALP BLD-CCNC: 71 U/L (ref 40–129)
ALT SERPL-CCNC: 17 U/L (ref 10–40)
ANION GAP SERPL CALCULATED.3IONS-SCNC: 11 MMOL/L (ref 3–16)
AST SERPL-CCNC: 16 U/L (ref 15–37)
BASOPHILS ABSOLUTE: 0.1 K/UL (ref 0–0.2)
BASOPHILS RELATIVE PERCENT: 1 %
BILIRUB SERPL-MCNC: 0.7 MG/DL (ref 0–1)
BILIRUBIN DIRECT: <0.2 MG/DL (ref 0–0.3)
BILIRUBIN, INDIRECT: NORMAL MG/DL (ref 0–1)
BUN BLDV-MCNC: 10 MG/DL (ref 7–20)
CALCIUM SERPL-MCNC: 9 MG/DL (ref 8.3–10.6)
CHLORIDE BLD-SCNC: 104 MMOL/L (ref 99–110)
CO2: 20 MMOL/L (ref 21–32)
CREAT SERPL-MCNC: 0.6 MG/DL (ref 0.6–1.1)
EOSINOPHILS ABSOLUTE: 0 K/UL (ref 0–0.6)
EOSINOPHILS RELATIVE PERCENT: 0.2 %
GFR AFRICAN AMERICAN: >60
GFR NON-AFRICAN AMERICAN: >60
GLUCOSE BLD-MCNC: 155 MG/DL (ref 70–99)
HCT VFR BLD CALC: 43.4 % (ref 36–48)
HEMOGLOBIN: 14.7 G/DL (ref 12–16)
LIPASE: 23 U/L (ref 13–60)
LYMPHOCYTES ABSOLUTE: 1.6 K/UL (ref 1–5.1)
LYMPHOCYTES RELATIVE PERCENT: 13.6 %
MAGNESIUM: 1.8 MG/DL (ref 1.8–2.4)
MCH RBC QN AUTO: 30.3 PG (ref 26–34)
MCHC RBC AUTO-ENTMCNC: 33.8 G/DL (ref 31–36)
MCV RBC AUTO: 89.7 FL (ref 80–100)
MONOCYTES ABSOLUTE: 0.5 K/UL (ref 0–1.3)
MONOCYTES RELATIVE PERCENT: 4.2 %
NEUTROPHILS ABSOLUTE: 9.4 K/UL (ref 1.7–7.7)
NEUTROPHILS RELATIVE PERCENT: 81 %
PDW BLD-RTO: 13.9 % (ref 12.4–15.4)
PLATELET # BLD: 289 K/UL (ref 135–450)
PMV BLD AUTO: 9.5 FL (ref 5–10.5)
POTASSIUM REFLEX MAGNESIUM: 3.5 MMOL/L (ref 3.5–5.1)
RBC # BLD: 4.84 M/UL (ref 4–5.2)
SODIUM BLD-SCNC: 135 MMOL/L (ref 136–145)
TOTAL PROTEIN: 7.1 G/DL (ref 6.4–8.2)
WBC # BLD: 11.6 K/UL (ref 4–11)

## 2018-08-20 PROCEDURE — 83735 ASSAY OF MAGNESIUM: CPT

## 2018-08-20 PROCEDURE — 80076 HEPATIC FUNCTION PANEL: CPT

## 2018-08-20 PROCEDURE — 36415 COLL VENOUS BLD VENIPUNCTURE: CPT

## 2018-08-20 PROCEDURE — 80048 BASIC METABOLIC PNL TOTAL CA: CPT

## 2018-08-20 PROCEDURE — 71046 X-RAY EXAM CHEST 2 VIEWS: CPT

## 2018-08-20 PROCEDURE — 93005 ELECTROCARDIOGRAM TRACING: CPT | Performed by: STUDENT IN AN ORGANIZED HEALTH CARE EDUCATION/TRAINING PROGRAM

## 2018-08-20 PROCEDURE — 96366 THER/PROPH/DIAG IV INF ADDON: CPT

## 2018-08-20 PROCEDURE — 83690 ASSAY OF LIPASE: CPT

## 2018-08-20 PROCEDURE — 96365 THER/PROPH/DIAG IV INF INIT: CPT

## 2018-08-20 PROCEDURE — 6370000000 HC RX 637 (ALT 250 FOR IP): Performed by: STUDENT IN AN ORGANIZED HEALTH CARE EDUCATION/TRAINING PROGRAM

## 2018-08-20 PROCEDURE — 2580000003 HC RX 258: Performed by: STUDENT IN AN ORGANIZED HEALTH CARE EDUCATION/TRAINING PROGRAM

## 2018-08-20 PROCEDURE — 99285 EMERGENCY DEPT VISIT HI MDM: CPT

## 2018-08-20 PROCEDURE — 85025 COMPLETE CBC W/AUTO DIFF WBC: CPT

## 2018-08-20 RX ORDER — BENZONATATE 100 MG/1
100 CAPSULE ORAL 3 TIMES DAILY PRN
Qty: 30 CAPSULE | Refills: 0 | Status: SHIPPED | OUTPATIENT
Start: 2018-08-20 | End: 2018-08-27

## 2018-08-20 RX ORDER — CYCLOBENZAPRINE HCL 10 MG
10 TABLET ORAL ONCE
Status: COMPLETED | OUTPATIENT
Start: 2018-08-20 | End: 2018-08-20

## 2018-08-20 RX ORDER — SODIUM CHLORIDE, SODIUM LACTATE, POTASSIUM CHLORIDE, CALCIUM CHLORIDE 600; 310; 30; 20 MG/100ML; MG/100ML; MG/100ML; MG/100ML
1000 INJECTION, SOLUTION INTRAVENOUS CONTINUOUS
Status: DISCONTINUED | OUTPATIENT
Start: 2018-08-20 | End: 2018-08-20 | Stop reason: HOSPADM

## 2018-08-20 RX ADMIN — SODIUM CHLORIDE, POTASSIUM CHLORIDE, SODIUM LACTATE AND CALCIUM CHLORIDE 1000 ML: 600; 310; 30; 20 INJECTION, SOLUTION INTRAVENOUS at 14:32

## 2018-08-20 RX ADMIN — CYCLOBENZAPRINE HYDROCHLORIDE 10 MG: 10 TABLET, FILM COATED ORAL at 14:27

## 2018-08-20 ASSESSMENT — ENCOUNTER SYMPTOMS
NAUSEA: 1
ABDOMINAL PAIN: 1
SHORTNESS OF BREATH: 1
BACK PAIN: 1
VOMITING: 1
COUGH: 1

## 2018-08-20 ASSESSMENT — PAIN SCALES - GENERAL: PAINLEVEL_OUTOF10: 8

## 2018-08-20 NOTE — ED PROVIDER NOTES
[address] [facility]          EM RESIDENT NOTE       Date of evaluation: 2018    Chief Complaint     Cough; Emesis; and Shortness of Breath      History of Present Illness     Sherman Hsu is a 40 y.o. female with a history of tobacco abuse and chronic cough who presents with worsening cough productive of clear sputum, back pain, and emesis for the past few days. The back pain is exacerbated by coughing. She has not had any episodes of emesis today. She denies fevers, sick contacts, or recent travel history. She reports chronic epigastric pain unchanged from her usual.  She requests Flexeril for her back pain. Review of Systems     Review of Systems   Constitutional: Negative for fever. Respiratory: Positive for cough and shortness of breath. Cardiovascular: Negative for chest pain and leg swelling. Gastrointestinal: Positive for abdominal pain, nausea and vomiting. Musculoskeletal: Positive for back pain. Psychiatric/Behavioral: Negative. Past Medical, Surgical, Family, and Social History     She has a past medical history of Anxiety; Bipolar affective disorder ([facility] [address] CHF (congestive heart failure) ([facility] [address] Chronic back pain; DDD (degenerative disc disease); DDD (degenerative disc disease), cervical; DJD (degenerative joint disease), multiple sites; Fibromyalgia; GERD (gastroesophageal reflux disease); Headache; Insomnia; Osteoarthritis; and Tendinitis. She has a past surgical history that includes Spine surgery;  section; Cholecystectomy; Tonsillectomy; Carpal tunnel release (Bilateral); and Elbow surgery (Left). Her family history includes Alcohol Abuse in her mother; Arthritis in her maternal grandmother and mother; Heart Disease in her maternal grandmother, maternal uncle, and mother; High Blood Pressure in her mother; Sleep Apnea in her mother. She reports that she has been smoking Cigarettes. She has a 10.00 pack-year smoking history.  She mg/dL    Bilirubin, Indirect see below 0.0 - 1.0 mg/dL   Magnesium   Result Value Ref Range    Magnesium 1.80 1.80 - 2.40 mg/dL   EKG 12 Lead   Result Value Ref Range    Ventricular Rate 93 BPM    Atrial Rate 93 BPM    P-R Interval 130 ms    QRS Duration 76 ms    Q-T Interval 358 ms    QTc Calculation (Bazett) 445 ms    P Axis 68 degrees    R Axis 60 degrees    T Axis 51 degrees    Diagnosis       EKG performed in ER and to be interpreted by ER physician. Confirmed by MD, ER ([id number]),  Alan Schreiber ([id number]) on 8/20/2018 3:12:04 PM         RECENT VITALS:  BP: 117/82, Temp: 98.2 °F (36.8 °C), Pulse: 70, Resp: 16, SpO2: 100 %     Procedures         ED Course     Nursing Notes, Past Medical Hx, Past Surgical Hx, Social Hx, Allergies, and Family Hx were reviewed. The patient was given the following medications:  Orders Placed This Encounter   Medications    cyclobenzaprine (FLEXERIL) tablet 10 mg    DISCONTD: lactated ringers infusion 1,000 mL    benzonatate (TESSALON PERLES) 100 MG capsule     Sig: Take 1 capsule by mouth 3 times daily as needed for Cough     Dispense:  30 capsule     Refill:  0       CONSULTS:  None    MEDICAL DECISION MAKING / ASSESSMENT / Para Say is a 40 y.o. female presenting for exacerbation of a chronic cough. EKG and chest x-ray were normal, and her labs are unremarkable. She has been afebrile, hemodynamically stable, and has appeared comfortable without coughing throughout her visit. Therefore, she was discharged home with Fred Lira for the cough. She was advised to try to quit smoking and to follow up with her PCP as needed. This patient was also evaluated by the attending physician. All care plans were discussed and agreed upon. Clinical Impression     1. Cough        Disposition     PATIENT REFERRED TO:  No follow-up provider specified.     DISCHARGE MEDICATIONS:  Discharge Medication List as of 8/20/2018  4:03 PM      START taking these medications    Details benzonatate (TESSALON PERLES) 100 MG capsule Take 1 capsule by mouth 3 times daily as needed for Cough, Disp-30 capsule, R-0Print             DISPOSITION    Discharged home     Mike Flores MD  Resident  08/20/18 2004

## 2018-08-20 NOTE — ED PROVIDER NOTES
ED Attending Attestation Note     Date of evaluation: 8/20/2018    This patient was seen by the resident. I have seen and examined the patient, agree with the workup, evaluation, management and diagnosis. The care plan has been discussed. I have reviewed the ECG and concur with the resident's interpretation. My assessment reveals Clear lungs, occasional cough, no wheezing noted at my examination.      Ryan Robbins MD  08/20/18 1538

## 2018-08-20 NOTE — ED NOTES
Pt is alert and oriented times four. Pt here for SOB, cough and vomiting. Pt states that she has had a cough for 2 weeks and stated for the past 2 days it has been worse. Pt states that she has been coughing so much sh vomited last night. Pt states it is a dry cough. Pt's LS clear. No edema noted to BLE. IV placed labs drawn. Call light in reach will continue to monitor.       Christopher Vidal RN  08/20/18 2965

## 2018-08-29 ENCOUNTER — HOSPITAL ENCOUNTER (EMERGENCY)
Age: 38
Discharge: HOME | End: 2018-08-29
Attending: EMERGENCY MEDICINE

## 2018-08-29 ENCOUNTER — TELEPHONE (OUTPATIENT)
Dept: SLEEP MEDICINE | Age: 38
End: 2018-08-29

## 2018-08-29 VITALS
OXYGEN SATURATION: 98 % | TEMPERATURE: 98.1 F | SYSTOLIC BLOOD PRESSURE: 132 MMHG | HEART RATE: 98 BPM | RESPIRATION RATE: 16 BRPM | DIASTOLIC BLOOD PRESSURE: 82 MMHG

## 2018-08-29 DIAGNOSIS — R53.83 FATIGUE, UNSPECIFIED TYPE: Primary | ICD-10-CM

## 2018-08-29 DIAGNOSIS — M79.7 FIBROMYALGIA: ICD-10-CM

## 2018-08-29 LAB
ALBUMIN SERPL-MCNC: 4.3 G/DL (ref 3.4–5)
ALP BLD-CCNC: 61 U/L (ref 40–129)
ALT SERPL-CCNC: 10 U/L (ref 10–40)
ANION GAP SERPL CALCULATED.3IONS-SCNC: 11 MMOL/L (ref 3–16)
AST SERPL-CCNC: 15 U/L (ref 15–37)
BASE EXCESS VENOUS: -1 (ref -3–3)
BASOPHILS ABSOLUTE: 0 K/UL (ref 0–0.2)
BASOPHILS RELATIVE PERCENT: 0.2 %
BILIRUB SERPL-MCNC: 0.3 MG/DL (ref 0–1)
BILIRUBIN DIRECT: <0.2 MG/DL (ref 0–0.3)
BILIRUBIN, INDIRECT: NORMAL MG/DL (ref 0–1)
BUN BLDV-MCNC: 9 MG/DL (ref 7–20)
CALCIUM SERPL-MCNC: 9.5 MG/DL (ref 8.3–10.6)
CHLORIDE BLD-SCNC: 107 MMOL/L (ref 99–110)
CO2: 24 MMOL/L (ref 21–32)
CREAT SERPL-MCNC: 0.8 MG/DL (ref 0.6–1.1)
EOSINOPHILS ABSOLUTE: 0.1 K/UL (ref 0–0.6)
EOSINOPHILS RELATIVE PERCENT: 0.7 %
GFR AFRICAN AMERICAN: >60
GFR NON-AFRICAN AMERICAN: >60
GLUCOSE BLD-MCNC: 108 MG/DL (ref 70–99)
HCO3 VENOUS: 24.3 MMOL/L (ref 23–29)
HCT VFR BLD CALC: 44 % (ref 36–48)
HEMOGLOBIN: 14.7 G/DL (ref 12–16)
LACTATE: 0.74 MMOL/L (ref 0.4–2)
LYMPHOCYTES ABSOLUTE: 3.1 K/UL (ref 1–5.1)
LYMPHOCYTES RELATIVE PERCENT: 30.3 %
MCH RBC QN AUTO: 30.1 PG (ref 26–34)
MCHC RBC AUTO-ENTMCNC: 33.4 G/DL (ref 31–36)
MCV RBC AUTO: 90.2 FL (ref 80–100)
MONOCYTES ABSOLUTE: 0.7 K/UL (ref 0–1.3)
MONOCYTES RELATIVE PERCENT: 6.8 %
NEUTROPHILS ABSOLUTE: 6.4 K/UL (ref 1.7–7.7)
NEUTROPHILS RELATIVE PERCENT: 62 %
O2 SAT, VEN: 95 %
PCO2, VEN: 40.5 MM HG (ref 40–50)
PDW BLD-RTO: 14.1 % (ref 12.4–15.4)
PERFORMED ON: NORMAL
PH VENOUS: 7.39 (ref 7.35–7.45)
PLATELET # BLD: 280 K/UL (ref 135–450)
PMV BLD AUTO: 9.2 FL (ref 5–10.5)
PO2, VEN: 77 MM HG
POC SAMPLE TYPE: NORMAL
POTASSIUM SERPL-SCNC: 3.8 MMOL/L (ref 3.5–5.1)
RBC # BLD: 4.88 M/UL (ref 4–5.2)
SODIUM BLD-SCNC: 142 MMOL/L (ref 136–145)
T3 TOTAL: 1.15 NG/ML (ref 0.8–2)
T4 FREE: 1.5 NG/DL (ref 0.9–1.8)
TCO2 CALC VENOUS: 26 MMOL/L
TOTAL PROTEIN: 7.2 G/DL (ref 6.4–8.2)
TSH REFLEX: 0.1 UIU/ML (ref 0.27–4.2)
WBC # BLD: 10.3 K/UL (ref 4–11)

## 2018-08-29 PROCEDURE — 84439 ASSAY OF FREE THYROXINE: CPT

## 2018-08-29 PROCEDURE — 80076 HEPATIC FUNCTION PANEL: CPT

## 2018-08-29 PROCEDURE — 99284 EMERGENCY DEPT VISIT MOD MDM: CPT

## 2018-08-29 PROCEDURE — 84443 ASSAY THYROID STIM HORMONE: CPT

## 2018-08-29 PROCEDURE — 83605 ASSAY OF LACTIC ACID: CPT

## 2018-08-29 PROCEDURE — 85025 COMPLETE CBC W/AUTO DIFF WBC: CPT

## 2018-08-29 PROCEDURE — 84480 ASSAY TRIIODOTHYRONINE (T3): CPT

## 2018-08-29 PROCEDURE — 80048 BASIC METABOLIC PNL TOTAL CA: CPT

## 2018-08-29 PROCEDURE — 82803 BLOOD GASES ANY COMBINATION: CPT

## 2018-08-29 ASSESSMENT — ENCOUNTER SYMPTOMS
GASTROINTESTINAL NEGATIVE: 1
RESPIRATORY NEGATIVE: 1

## 2018-08-29 NOTE — ED PROVIDER NOTES
(Left). Her family history includes Alcohol Abuse in her mother; Arthritis in her maternal grandmother and mother; Heart Disease in her maternal grandmother, maternal uncle, and mother; High Blood Pressure in her mother; Sleep Apnea in her mother. She reports that she has been smoking Cigarettes. She has a 10.00 pack-year smoking history. She has never used smokeless tobacco. She reports that she does not drink alcohol or use drugs. Medications     Discharge Medication List as of 8/29/2018  3:44 AM      CONTINUE these medications which have NOT CHANGED    Details   pregabalin (LYRICA) 225 MG capsule Take 1 capsule by mouth 2 times daily. ., Disp-60 capsule, R-2Normal      Cholecalciferol (VITAMIN D3) 1000 units CAPS Take 1 capsule by mouth 2 times dailyHistorical Med      Magnesium 400 MG TABS Take 1 tablet by mouth dailyHistorical Med      fluticasone (FLONASE) 50 MCG/ACT nasal spray 1 spray by Nasal route daily, Disp-1 Bottle, R-3Normal      meloxicam (MOBIC) 15 MG tablet Take 1 tablet by mouth daily as needed (pain), Disp-30 tablet, R-5Normal      famotidine (PEPCID) 20 MG tablet Take 1 tablet by mouth 2 times daily, Disp-60 tablet, R-0Print             Allergies     She is allergic to oxytocin; aspirin; ibuprofen; nsaids; phenergan [promethazine hcl]; promethazine; and tylenol [acetaminophen]. Physical Exam     INITIAL VITALS: BP: 132/82, Temp: 98.1 °F (36.7 °C), Pulse: 98, Resp: 16, SpO2: 98 %   Physical Exam   Constitutional: She is oriented to person, place, and time. She appears well-developed and well-nourished. No distress. HENT:   Head: Normocephalic and atraumatic. Mouth/Throat: Oropharynx is clear and moist. No oropharyngeal exudate. Eyes: Pupils are equal, round, and reactive to light. Conjunctivae and EOM are normal. No scleral icterus. Neck: Normal range of motion. Neck supple. No JVD present. Cardiovascular: Normal rate, regular rhythm and normal heart sounds.   Exam reveals no gallop and no friction rub. No murmur heard. Pulmonary/Chest: Effort normal and breath sounds normal. She has no wheezes. Abdominal: Soft. Bowel sounds are normal. There is no tenderness. There is no rebound and no guarding. Musculoskeletal: Normal range of motion. She exhibits no edema. Neurological: She is alert and oriented to person, place, and time. No cranial nerve deficit. She exhibits normal muscle tone. Coordination normal.   Skin: Skin is warm and dry. No erythema. Psychiatric: Her mood appears anxious. Nursing note and vitals reviewed.       Diagnostic Results     LABS:   Results for orders placed or performed during the hospital encounter of 08/29/18   CBC auto differential   Result Value Ref Range    WBC 10.3 4.0 - 11.0 K/uL    RBC 4.88 4.00 - 5.20 M/uL    Hemoglobin 14.7 12.0 - 16.0 g/dL    Hematocrit 44.0 36.0 - 48.0 %    MCV 90.2 80.0 - 100.0 fL    MCH 30.1 26.0 - 34.0 pg    MCHC 33.4 31.0 - 36.0 g/dL    RDW 14.1 12.4 - 15.4 %    Platelets 551 722 - 534 K/uL    MPV 9.2 5.0 - 10.5 fL    Neutrophils % 62.0 %    Lymphocytes % 30.3 %    Monocytes % 6.8 %    Eosinophils % 0.7 %    Basophils % 0.2 %    Neutrophils # 6.4 1.7 - 7.7 K/uL    Lymphocytes # 3.1 1.0 - 5.1 K/uL    Monocytes # 0.7 0.0 - 1.3 K/uL    Eosinophils # 0.1 0.0 - 0.6 K/uL    Basophils # 0.0 0.0 - 0.2 K/uL   Basic Metabolic Panel (EP - 1)   Result Value Ref Range    Sodium 142 136 - 145 mmol/L    Potassium 3.8 3.5 - 5.1 mmol/L    Chloride 107 99 - 110 mmol/L    CO2 24 21 - 32 mmol/L    Anion Gap 11 3 - 16    Glucose 108 (H) 70 - 99 mg/dL    BUN 9 7 - 20 mg/dL    CREATININE 0.8 0.6 - 1.1 mg/dL    GFR Non-African American >60 >60    GFR African American >60 >60    Calcium 9.5 8.3 - 10.6 mg/dL   Hepatic function panel (LFTs)   Result Value Ref Range    Total Protein 7.2 6.4 - 8.2 g/dL    Alb 4.3 3.4 - 5.0 g/dL    Alkaline Phosphatase 61 40 - 129 U/L    ALT 10 10 - 40 U/L    AST 15 15 - 37 U/L    Total Bilirubin 0.3 0.0 - 1.0 mg/dL Bilirubin, Direct <0.2 0.0 - 0.3 mg/dL    Bilirubin, Indirect see below 0.0 - 1.0 mg/dL   TSH with Reflex   Result Value Ref Range    TSH 0.10 (L) 0.27 - 4.20 uIU/mL   POCT Venous   Result Value Ref Range    pH, Alethea 7.386 7.350 - 7.450    pCO2, Alethea 40.5 40.0 - 50.0 mm Hg    pO2, Alethea 77 Not Established mm Hg    HCO3, Venous 24.3 23.0 - 29.0 mmol/L    Base Excess, Alethea -1 -3 - 3    O2 Sat, Alethea 95 Not Established %    TC02 (Calc), Alethea 26 Not Established mmol/L    Lactate 0.74 0.40 - 2.00 mmol/L    Sample Type ALETHEA     Performed on SEE BELOW      RECENT VITALS:  BP: 132/82, Temp: 98.1 °F (36.7 °C), Pulse: 98, Resp: 16, SpO2: 98 %     Procedures     N/A    ED Course     Nursing Notes, Past Medical Hx, Past Surgical Hx, Social Hx, Allergies, and Family Hx were reviewed. The patient was given the following medications:  No orders of the defined types were placed in this encounter. CONSULTS:  None    MEDICAL DECISION MAKING / ASSESSMENT / PLAN     Samm Conte is a 40 y.o. female multiple medical problems as well as psychiatric problems presents to the emergency department complaining of fatigue, insomnia, and difficulty concentrating. Patient has no focal neurologic deficits on exam.  Laboratory studies are unremarkable. I feel likely a significant portion of her symptoms from sleep apnea. In review of her sleep study, she did have multiple episodes of hypoxia and was recommended for CPAP and has been unable to follow up with this. I will recommend that she contact her sleep physician to have this testing completed. I will sufficient for intercranial abdomen only so do not feel CT scan is necessary. Patient will be instructed follow-up with her primary care provider for repeat evaluation. Clinical Impression     1.  Fatigue, unspecified type        Disposition     PATIENT REFERRED TO:  Lizzeth Kim,   [address] [facility] [address], [address] [facility] [address]  [phone]            DISCHARGE MEDICATIONS:  Discharge Medication List as of 8/29/2018  3:44 AM          DISPOSITION Decision To Discharge 08/29/2018 03:28:45 AM        Chuyita Mullen MD  08/29/18 1257

## 2018-08-29 NOTE — LETTER
[facility] [address]    To:    Skippy Meter  9/4/2018    M(r)(s). Sherman Hsu    This letter is intended to inform you that I have personally decided to stop writing controlled substances long-term. If you are receiving this letter it means you are requesting refills on one or more controlled substance(s) from my practice; [facility] specifically. The underlying reasons are many, including regulatory mandates, which is rightfully so due to many incidents of health complications related to long-term controlled substances use. Please be informed that you have 90 (ninety) days from the date of this letter to find an alternative, such as seeing a specialist to further identify the underlying health issues related to the use of controlled substances. If you would like, we can set up a referral to a provider of your choice. Pain management. Sincerely,      Cee Sandoval.  Becky Grant, DO

## 2018-08-29 NOTE — TELEPHONE ENCOUNTER
Spoke with pt to remind her that she has not scheduled her titration study. Pilar Bravo from sleep lab was asked to call pt to schedule. Pt has lost insurance and will need to call billing for payment information.

## 2018-08-29 NOTE — LETTER
[facility] Emergency Department  [address] [place] [id number]  Phone: [phone]  Fax: [phone]               August 29, 2018    Patient: Gerhardt Punch   YOB: 1980   Date of Visit: 8/29/2018       To Whom It May Concern:    Gerhardt Punch was seen and treated in our emergency department on 8/29/2018. She may return to work on 8/30/18.       Sincerely,       Carlo Altamirano MD         Signature:__________________________________

## 2018-08-29 NOTE — TELEPHONE ENCOUNTER
----- Message from Farhad Christianson MD sent at 8/29/2018  8:58 AM EDT -----  Please call patient and remind her she needs scheduled for titration ASAP.     Ronald Niño MD  ----- Message -----  From: Goldy Delcid MD  Sent: 8/29/2018   3:51 AM  To: Farhad Christianson MD

## 2018-08-29 NOTE — TELEPHONE ENCOUNTER
Spoke with pt to let her know that we have spoke to a DME rep and may be able to get her a used unit. If so they will provide supplies by setting up a payment arrangement with her. Pt states that her mother is passed and she may be able to get her pap unit. Pt to call our office 08/30/2048 with information about unit.

## 2018-08-30 ENCOUNTER — TELEPHONE (OUTPATIENT)
Dept: INTERNAL MEDICINE CLINIC | Age: 38
End: 2018-08-30

## 2018-09-04 RX ORDER — PREGABALIN 225 MG/1
225 CAPSULE ORAL 2 TIMES DAILY
Qty: 60 CAPSULE | Refills: 0 | Status: SHIPPED | OUTPATIENT
Start: 2018-09-04 | End: 2018-10-03 | Stop reason: SDUPTHER

## 2018-09-04 NOTE — TELEPHONE ENCOUNTER
NARX SCORES  Narcotic  310  Sedative  440  Stimulant  000  Explanation and Guidance  OVERDOSE RISK SCORE     460  (Range 000-999)  Explanation and Guidance    08/01/2018 1  06/04/2018 LYRICA 225 MG CAPSULE 60 30 PE WAN [id number]([id number]) 2 3.02 LME [place] Ins [place]   07/04/2018 1  06/04/2018 LYRICA 225 MG CAPSULE 60 30 PE WAN [id number] FTX([id number]) 1 3.02 [place]   06/06/2018 1  06/04/2018 LYRICA 225 MG CAPSULE 60 30 PE WAN [id number] DGU([id number]) 0 3.02 [place]   05/09/2018 1  03/14/2018 LYRICA 225 MG CAPSULE 60 30 PE WAN [id number] XOC([id number]) 2 3.02 [place]   04/11/2018 1  03/14/2018 LYRICA 225 MG CAPSULE 60 30 PE WAN [id number] EKY([id number]) 1 3.02 LME [place]     Minimal effective dosing/frequency, for fibromyalgia. Patient understands the side effect profile including sedation/apnea/agitation/racing-heart of overtaking or being non-compliant with dosing. Patient understand it's the provider's prerogative to stop writing these medication(s) at any time. Patient was informed of the habit forming nature of this medication, even if taking short-term; patient was advised to take the controlled substance PRN, not an automatic dosing regiment. Letter of intent will be mailed out.

## 2018-09-05 LAB
EKG ATRIAL RATE: 93 BPM
EKG DIAGNOSIS: NORMAL
EKG P AXIS: 68 DEGREES
EKG P-R INTERVAL: 130 MS
EKG Q-T INTERVAL: 358 MS
EKG QRS DURATION: 76 MS
EKG QTC CALCULATION (BAZETT): 445 MS
EKG R AXIS: 60 DEGREES
EKG T AXIS: 51 DEGREES
EKG VENTRICULAR RATE: 93 BPM

## 2018-09-24 ENCOUNTER — HOSPITAL ENCOUNTER (EMERGENCY)
Age: 38
Discharge: HOME | End: 2018-09-24
Attending: EMERGENCY MEDICINE

## 2018-09-24 VITALS
RESPIRATION RATE: 14 BRPM | HEART RATE: 103 BPM | DIASTOLIC BLOOD PRESSURE: 93 MMHG | OXYGEN SATURATION: 99 % | SYSTOLIC BLOOD PRESSURE: 133 MMHG | TEMPERATURE: 99.1 F

## 2018-09-24 DIAGNOSIS — G62.9 PERIPHERAL POLYNEUROPATHY: Primary | ICD-10-CM

## 2018-09-24 DIAGNOSIS — M54.50 ACUTE EXACERBATION OF CHRONIC LOW BACK PAIN: ICD-10-CM

## 2018-09-24 DIAGNOSIS — G89.29 ACUTE EXACERBATION OF CHRONIC LOW BACK PAIN: ICD-10-CM

## 2018-09-24 PROCEDURE — 86780 TREPONEMA PALLIDUM: CPT

## 2018-09-24 PROCEDURE — 84425 ASSAY OF VITAMIN B-1: CPT

## 2018-09-24 PROCEDURE — 99283 EMERGENCY DEPT VISIT LOW MDM: CPT

## 2018-09-24 PROCEDURE — 6370000000 HC RX 637 (ALT 250 FOR IP): Performed by: STUDENT IN AN ORGANIZED HEALTH CARE EDUCATION/TRAINING PROGRAM

## 2018-09-24 PROCEDURE — 82607 VITAMIN B-12: CPT

## 2018-09-24 PROCEDURE — 82746 ASSAY OF FOLIC ACID SERUM: CPT

## 2018-09-24 RX ORDER — PREGABALIN 50 MG/1
200 CAPSULE ORAL ONCE
Status: COMPLETED | OUTPATIENT
Start: 2018-09-24 | End: 2018-09-24

## 2018-09-24 RX ORDER — LIDOCAINE 50 MG/G
1 PATCH TOPICAL ONCE
Status: DISCONTINUED | OUTPATIENT
Start: 2018-09-24 | End: 2018-09-25 | Stop reason: HOSPADM

## 2018-09-24 RX ADMIN — PREGABALIN 200 MG: 50 CAPSULE ORAL at 20:45

## 2018-09-24 ASSESSMENT — ENCOUNTER SYMPTOMS
CONSTIPATION: 0
SHORTNESS OF BREATH: 0
SORE THROAT: 0
WHEEZING: 0
BACK PAIN: 1
VOMITING: 0
DIARRHEA: 0
COUGH: 0
ABDOMINAL PAIN: 0
CHEST TIGHTNESS: 0
PHOTOPHOBIA: 0
NAUSEA: 0
RHINORRHEA: 0

## 2018-09-24 ASSESSMENT — PAIN DESCRIPTION - PAIN TYPE: TYPE: ACUTE PAIN

## 2018-09-24 ASSESSMENT — PAIN SCALES - GENERAL: PAINLEVEL_OUTOF10: 8

## 2018-09-24 ASSESSMENT — PAIN DESCRIPTION - LOCATION: LOCATION: BACK

## 2018-09-24 ASSESSMENT — PAIN DESCRIPTION - PROGRESSION: CLINICAL_PROGRESSION: NOT CHANGED

## 2018-09-24 ASSESSMENT — PAIN DESCRIPTION - DESCRIPTORS: DESCRIPTORS: TINGLING;BURNING

## 2018-09-24 ASSESSMENT — PAIN DESCRIPTION - ORIENTATION: ORIENTATION: LOWER

## 2018-09-24 ASSESSMENT — PAIN DESCRIPTION - FREQUENCY: FREQUENCY: CONTINUOUS

## 2018-09-24 NOTE — ED PROVIDER NOTES
and vomiting. Genitourinary: Negative for dysuria, frequency and urgency. Musculoskeletal: Positive for back pain. Negative for neck pain and neck stiffness. Neurological: Negative for dizziness and light-headedness. Past Medical, Surgical, Family, and Social History     She has a past medical history of Anxiety; Bipolar affective disorder ([facility] [address] CHF (congestive heart failure) ([facility] [address] Chronic back pain; DDD (degenerative disc disease); DDD (degenerative disc disease), cervical; DJD (degenerative joint disease), multiple sites; Fibromyalgia; GERD (gastroesophageal reflux disease); Headache; Insomnia; Osteoarthritis; and Tendinitis. She has a past surgical history that includes Spine surgery;  section; Cholecystectomy; Tonsillectomy; Carpal tunnel release (Bilateral); and Elbow surgery (Left). Her family history includes Alcohol Abuse in her mother; Arthritis in her maternal grandmother and mother; Heart Disease in her maternal grandmother, maternal uncle, and mother; High Blood Pressure in her mother; Sleep Apnea in her mother. She reports that she has been smoking Cigarettes. She has a 10.00 pack-year smoking history. She has never used smokeless tobacco. She reports that she does not drink alcohol or use drugs. Medications     Discharge Medication List as of 2018 10:20 PM      CONTINUE these medications which have NOT CHANGED    Details   pregabalin (LYRICA) 225 MG capsule Take 1 capsule by mouth 2 times daily. ., Disp-60 capsule, R-0Normal             Allergies     She is allergic to oxytocin; aspirin; ibuprofen; nsaids; phenergan [promethazine hcl]; promethazine; and tylenol [acetaminophen]. Physical Exam     INITIAL VITALS: BP: (!) 133/93, Temp: 99.1 °F (37.3 °C), Pulse: 103, Resp: 14, SpO2: 99 %   Physical Exam   Constitutional: She is oriented to person, place, and time. She appears well-developed and well-nourished. No distress. HENT:   Head: Normocephalic and atraumatic. Eyes: Pupils are equal, round, and reactive to light. EOM are normal.   Neck: Normal range of motion. Neck supple. No cervical midline tenderness, full ROM   Cardiovascular:   Tachycardic, regular rhythm, normal S1/S2, no murmurs, rubs or gallops   Pulmonary/Chest: Effort normal and breath sounds normal. No respiratory distress. She has no wheezes. She has no rales. Abdominal: Soft. Bowel sounds are normal. She exhibits no distension. There is no tenderness. There is no rebound and no guarding. Musculoskeletal: She exhibits no edema. Reports paraesthesia when right leg is touched, hyperaesthetic to touch, dorsiflexion and plantar flexion at ankle intact  Paraspinal and midline tenderness in lumbar/sacral region, no step-offs. DP and PT pulses intact and symmetric  Sensation to light touch intact bilateral lower extremities   Neurological: She is alert and oriented to person, place, and time. No cranial nerve deficit. Skin: Skin is warm and dry. She is not diaphoretic. No erythema. Diagnostic Results       RADIOLOGY:  No orders to display       LABS:   No results found for this visit on 09/24/18. RECENT VITALS:  BP: (!) 133/93, Temp: 99.1 °F (37.3 °C), Pulse: 103, Resp: 14, SpO2: 99 %         ED Course     Nursing Notes, Past Medical Hx, Past Surgical Hx, Social Hx, Allergies, and Family Hx were reviewed. The patient was given the following medications:  Orders Placed This Encounter   Medications    pregabalin (LYRICA) capsule 200 mg    lidocaine (LIDODERM) 5 % 1 patch       CONSULTS:  None    MEDICAL DECISION MAKING / ASSESSMENT / David Luther is a 45 y.o. female with history of chronic back pain, HILDA, bipolar affective disorder, fibromyalgia presenting with acute on chronic back pain and paraesthesia and hyperesthesia in right lower extremity. Patient is afebrile and non-toxic appearing. Reports paraesthesia started on Friday, circumferential and stocking like in distribution. Has been avoiding walking on it due to the uncomfortable sensation. Denies trauma to the area. Pulses intact, sensation to light touch intact. Non tender to palpation but is hyperasthestic. Do not suspect vascular etiology. Has been having these episodes intermittently for several years. May have peripheral polyneuropathy. Has had EMG in the past but sounds like it was for sciatica at that time. Patient may benefit from another EMG. Will send folate, B-12, RPR and thiamine levels and have patient follow up with her PCP. Also with back pain that started at 3 PM today, 8/10 in intensity, stabbing. No trauma. No urinary or bowel incontinence, no urinary retention, no saddle anesthesia. Low suspicion for cord compression. Suspect pain is exacerbated due to altered gait from paraesthesia. Do not feel imaging is necessary with lack of trauma to the area. Will give patient 200 mg Lyrica & lidocaine patch. Will have patient follow up with PCP with strict return precautions. This patient was also evaluated by the attending physician. All care plans were discussed and agreed upon. Clinical Impression     1. Peripheral polyneuropathy    2.  Acute exacerbation of chronic low back pain        Disposition     PATIENT REFERRED TO:  Dominguez Barrera, [facility] [address]  [phone]    Schedule an appointment as soon as possible for a visit in 1 week  ED Discharge Follow Up      DISCHARGE MEDICATIONS:  Discharge Medication List as of 9/24/2018 10:20 PM          DISPOSITION Decision To Discharge 09/24/2018 10:19:43 PM      Isidra Fernando MD  Resident  09/24/18 8546

## 2018-09-25 LAB
FOLATE: 5.66 NG/ML (ref 4.78–24.2)
TOTAL SYPHILLIS IGG/IGM: NORMAL
VITAMIN B-12: 276 PG/ML (ref 211–911)

## 2018-09-25 NOTE — ED NOTES
Patient prepared for and ready to be discharged. Patient discharged at this time in no acute distress after verbalizing understanding of discharge instructions. Patient left after receiving After Visit Summary instructions.         Angel Arechiga RN  09/24/18 1656

## 2018-09-25 NOTE — ED PROVIDER NOTES
ED Attending Attestation Note     Date of evaluation: 9/24/2018    This patient was seen by the resident. I have seen and examined the patient, agree with the workup, evaluation, management and diagnosis. The care plan has been discussed. My assessment reveals An overall well-appearing patient, pleasant and conversational, in no acute distress. She has a history of chronic low back pain with surgical intervention in the past.  She also has a history of fibromyalgia for which she takes lyrica b.i.d. She also describes to me intermittent episodes of [place] buttocks sensation which occurs circumferentially about the right lower leg below the knee, but which does not typically extend to the foot, and typically resolves relatively quickly after taking lyrica, and eating a banana. This time, however, she describes hyperesthesia and paresthetic numbness and tingling which is circumferential in a stocking distribution from just below the knee all the way to the foot and has been present for about 3 days now. She does have some hyperesthesia in this area, without any tenderness to palpation underlying. She feels as though she has difficulty with use of the foot and ankle. She is able to dorsiflex and plantar flex, but feels as though there is \"pressure\" in her foot and ankle when she does so. She has excellent DP and PT pulses, symmetric bilaterally. She has symmetric capillary refill. Both of her feet are slightly cool to the touch, but symmetrically so. She has no edema, warmth, or erythema. Overall, there are no findings concerning for a vascular cause of the patient's paresthesia and hyperesthesia. She has no radicular distribution of pain to suggest that this paresthetic sensation is related to her back. This appears to be a peripheral neuropathy, although the etiology of this is somewhat uncertain.   It may be subacute, as she has been experiencing less severe, less persistent similar symptoms for quite some time. It appears that she had thyroid studies checked by her primary care provider last month, which were not particularly abnormal.  We will send some vitamin levels to see if this may be contributing to a peripheral neuropathy, and I have asked her to follow up with her primary care provider, she may benefit from further evaluation, and potentially an EMG.          Naye Hernandez MD  09/24/18 8050

## 2018-09-28 LAB — VITAMIN B1 WHOLE BLOOD: 117 NMOL/L (ref 70–180)

## 2018-10-01 ENCOUNTER — TELEPHONE (OUTPATIENT)
Dept: INTERNAL MEDICINE CLINIC | Age: 38
End: 2018-10-01

## 2018-10-01 DIAGNOSIS — M79.7 FIBROMYALGIA: ICD-10-CM

## 2018-10-01 NOTE — TELEPHONE ENCOUNTER
Patient requesting refill for pregabalin (LYRICA) 225 MG capsule. Please call into pharmacy [facility] [address] [facility] [phone] - F [phone]. Patient scheduled follow up appointment.

## 2018-10-03 RX ORDER — PREGABALIN 225 MG/1
225 CAPSULE ORAL 2 TIMES DAILY
Qty: 60 CAPSULE | Refills: 0 | Status: SHIPPED | OUTPATIENT
Start: 2018-10-03 | End: 2018-10-30 | Stop reason: SDUPTHER

## 2018-10-12 ENCOUNTER — OFFICE VISIT (OUTPATIENT)
Dept: INTERNAL MEDICINE CLINIC | Age: 38
End: 2018-10-12
Payer: COMMERCIAL

## 2018-10-12 VITALS
HEART RATE: 94 BPM | RESPIRATION RATE: 16 BRPM | DIASTOLIC BLOOD PRESSURE: 68 MMHG | TEMPERATURE: 98.3 F | SYSTOLIC BLOOD PRESSURE: 106 MMHG | BODY MASS INDEX: 27.31 KG/M2 | WEIGHT: 160 LBS | HEIGHT: 64 IN | OXYGEN SATURATION: 99 %

## 2018-10-12 DIAGNOSIS — M54.41 ACUTE RIGHT-SIDED LOW BACK PAIN WITH RIGHT-SIDED SCIATICA: ICD-10-CM

## 2018-10-12 DIAGNOSIS — R79.89 LOW TSH LEVEL: Primary | ICD-10-CM

## 2018-10-12 DIAGNOSIS — R29.898 RIGHT LEG WEAKNESS: ICD-10-CM

## 2018-10-12 PROCEDURE — 99214 OFFICE O/P EST MOD 30 MIN: CPT | Performed by: INTERNAL MEDICINE

## 2018-10-12 RX ORDER — METHOCARBAMOL 500 MG/1
500 TABLET, FILM COATED ORAL 3 TIMES DAILY PRN
Qty: 30 TABLET | Refills: 0 | Status: SHIPPED | OUTPATIENT
Start: 2018-10-12 | End: 2018-10-22

## 2018-10-12 RX ORDER — PREDNISONE 10 MG/1
TABLET ORAL
Qty: 21 TABLET | Refills: 0 | Status: SHIPPED | OUTPATIENT
Start: 2018-10-12 | End: 2018-11-02 | Stop reason: ALTCHOICE

## 2018-10-15 ENCOUNTER — TELEPHONE (OUTPATIENT)
Dept: INTERNAL MEDICINE CLINIC | Age: 38
End: 2018-10-15

## 2018-10-15 RX ORDER — CYCLOBENZAPRINE HCL 10 MG
10 TABLET ORAL 3 TIMES DAILY PRN
Qty: 30 TABLET | Refills: 0 | Status: SHIPPED | OUTPATIENT
Start: 2018-10-15 | End: 2018-10-30 | Stop reason: SDUPTHER

## 2018-10-22 LAB
BUN BLDV-MCNC: 9 MG/DL
CALCIUM SERPL-MCNC: 9.4 MG/DL
CHLORIDE BLD-SCNC: 106 MMOL/L
CO2: 26 MMOL/L
CREAT SERPL-MCNC: 1.01 MG/DL
GFR CALCULATED: 71
GLUCOSE BLD-MCNC: 84 MG/DL
POTASSIUM SERPL-SCNC: 4.1 MMOL/L
SODIUM BLD-SCNC: 143 MMOL/L

## 2018-10-24 ENCOUNTER — TELEPHONE (OUTPATIENT)
Dept: INTERNAL MEDICINE CLINIC | Age: 38
End: 2018-10-24

## 2018-10-24 NOTE — TELEPHONE ENCOUNTER
Pt is requesting her lab results done on Monday at her office which is [facility].   Please call her at phone# provided

## 2018-10-25 ENCOUNTER — TELEPHONE (OUTPATIENT)
Dept: INTERNAL MEDICINE CLINIC | Age: 38
End: 2018-10-25

## 2018-10-26 ENCOUNTER — TELEPHONE (OUTPATIENT)
Dept: INTERNAL MEDICINE CLINIC | Age: 38
End: 2018-10-26

## 2018-10-26 ENCOUNTER — HOSPITAL ENCOUNTER (OUTPATIENT)
Dept: MRI IMAGING | Age: 38
Discharge: HOME | End: 2018-10-26
Payer: COMMERCIAL

## 2018-10-26 DIAGNOSIS — R29.898 RIGHT LEG WEAKNESS: ICD-10-CM

## 2018-10-26 DIAGNOSIS — M54.41 ACUTE RIGHT-SIDED LOW BACK PAIN WITH RIGHT-SIDED SCIATICA: ICD-10-CM

## 2018-10-26 DIAGNOSIS — M48.07 SPINAL STENOSIS OF LUMBOSACRAL REGION: Primary | ICD-10-CM

## 2018-10-26 PROCEDURE — 72158 MRI LUMBAR SPINE W/O & W/DYE: CPT

## 2018-10-26 PROCEDURE — A9579 GAD-BASE MR CONTRAST NOS,1ML: HCPCS | Performed by: INTERNAL MEDICINE

## 2018-10-26 PROCEDURE — 6360000004 HC RX CONTRAST MEDICATION: Performed by: INTERNAL MEDICINE

## 2018-10-26 RX ADMIN — GADOTERIDOL 13 ML: 279.3 INJECTION, SOLUTION INTRAVENOUS at 15:12

## 2018-10-26 NOTE — TELEPHONE ENCOUNTER
Mr(s). Nirmala Iraheta    Your lab/imaging results shows:    1) MRI shows L4-5 lumbar spinal stenosis that is mild  2) mild disc bulge    Recommendations:  No changes. Follow up with neurosurgery.

## 2018-10-30 ENCOUNTER — TELEPHONE (OUTPATIENT)
Dept: INTERNAL MEDICINE CLINIC | Age: 38
End: 2018-10-30

## 2018-10-30 DIAGNOSIS — M79.7 FIBROMYALGIA: ICD-10-CM

## 2018-11-02 ENCOUNTER — HOSPITAL ENCOUNTER (EMERGENCY)
Age: 38
Discharge: HOME | End: 2018-11-02
Attending: EMERGENCY MEDICINE
Payer: COMMERCIAL

## 2018-11-02 VITALS
BODY MASS INDEX: 27.46 KG/M2 | OXYGEN SATURATION: 98 % | WEIGHT: 160 LBS | TEMPERATURE: 97.8 F | DIASTOLIC BLOOD PRESSURE: 65 MMHG | RESPIRATION RATE: 18 BRPM | HEART RATE: 81 BPM | SYSTOLIC BLOOD PRESSURE: 109 MMHG

## 2018-11-02 DIAGNOSIS — M54.50 CHRONIC LOW BACK PAIN WITHOUT SCIATICA, UNSPECIFIED BACK PAIN LATERALITY: Primary | ICD-10-CM

## 2018-11-02 DIAGNOSIS — G89.29 CHRONIC LOW BACK PAIN WITHOUT SCIATICA, UNSPECIFIED BACK PAIN LATERALITY: Primary | ICD-10-CM

## 2018-11-02 PROCEDURE — 6370000000 HC RX 637 (ALT 250 FOR IP): Performed by: EMERGENCY MEDICINE

## 2018-11-02 PROCEDURE — 6360000002 HC RX W HCPCS: Performed by: EMERGENCY MEDICINE

## 2018-11-02 PROCEDURE — 99283 EMERGENCY DEPT VISIT LOW MDM: CPT

## 2018-11-02 PROCEDURE — 96372 THER/PROPH/DIAG INJ SC/IM: CPT

## 2018-11-02 RX ORDER — METHYLPREDNISOLONE 4 MG/1
TABLET ORAL
Qty: 1 KIT | Refills: 0 | Status: SHIPPED | OUTPATIENT
Start: 2018-11-02 | End: 2018-11-08

## 2018-11-02 RX ORDER — CYCLOBENZAPRINE HCL 10 MG
10 TABLET ORAL ONCE
Status: COMPLETED | OUTPATIENT
Start: 2018-11-02 | End: 2018-11-02

## 2018-11-02 RX ORDER — MORPHINE SULFATE 10 MG/ML
4 INJECTION, SOLUTION INTRAMUSCULAR; INTRAVENOUS ONCE
Status: COMPLETED | OUTPATIENT
Start: 2018-11-02 | End: 2018-11-02

## 2018-11-02 RX ORDER — DIAZEPAM 5 MG/1
5 TABLET ORAL EVERY 8 HOURS PRN
Qty: 10 TABLET | Refills: 0 | Status: SHIPPED | OUTPATIENT
Start: 2018-11-02 | End: 2018-11-05

## 2018-11-02 RX ADMIN — MORPHINE SULFATE 4 MG: 10 INJECTION, SOLUTION INTRAMUSCULAR; INTRAVENOUS at 21:12

## 2018-11-02 RX ADMIN — MORPHINE SULFATE 4 MG: 10 INJECTION, SOLUTION INTRAMUSCULAR; INTRAVENOUS at 19:34

## 2018-11-02 RX ADMIN — CYCLOBENZAPRINE HYDROCHLORIDE 10 MG: 10 TABLET, FILM COATED ORAL at 19:34

## 2018-11-02 ASSESSMENT — PAIN SCALES - GENERAL
PAINLEVEL_OUTOF10: 7
PAINLEVEL_OUTOF10: 10

## 2018-11-02 NOTE — ED PROVIDER NOTES
grandmother, maternal uncle, and mother; High Blood Pressure in her mother; Sleep Apnea in her mother. She reports that she has been smoking Cigarettes. She has a 10.00 pack-year smoking history. She has never used smokeless tobacco. She reports that she does not drink alcohol or use drugs. Medications     Previous Medications    PREGABALIN (LYRICA) 225 MG CAPSULE    Take 1 capsule by mouth 2 times daily. .       Allergies     She is allergic to oxytocin; aspirin; ibuprofen; nsaids; phenergan [promethazine hcl]; promethazine; and tylenol [acetaminophen]. Physical Exam     INITIAL VITALS: BP: 132/87, Temp: 97.8 °F (36.6 °C), Pulse: 88, Resp: 22, SpO2: 99 %   Constitutional:  Well developed, well nourished, no acute distress, non-toxic appearance  Neck:  Normal range of motion, no tenderness, supple   :  No costovertebral angle tenderness   Musculoskeletal:  No edema, no tenderness, no deformities. Straight leg raise Negative bilaterally  Back:  No midline spine tenderness, tenderness of the left paraspinous muscles of the lumbar and thoracic spine. Integument:  Well hydrated, no rash, no abrasion or bruising  Neurologic:  Motor, sensory, and reflex exams normal in both lower extremities    ED Course     Nursing Notes, Past Medical Hx, Past Surgical Hx, Social Hx, Allergies, and Family Hx were reviewed. The patient was given the following medications:  Orders Placed This Encounter   Medications    morphine injection 4 mg    cyclobenzaprine (FLEXERIL) tablet 10 mg    morphine injection 4 mg    methylPREDNISolone (MEDROL, KELSY,) 4 MG tablet     Sig: Take by mouth. Dispense:  1 kit     Refill:  0    diazepam (VALIUM) 5 MG tablet     Sig: Take 1 tablet by mouth every 8 hours as needed for Anxiety for up to 10 doses. .     Dispense:  10 tablet     Refill:  0       MEDICAL DECISION MAKING / ASSESSMENT / Andrea Kayla is a 45 y.o. female presenting with back pain. The patient denies recent trauma.

## 2018-11-06 ENCOUNTER — TELEPHONE (OUTPATIENT)
Dept: INTERNAL MEDICINE CLINIC | Age: 38
End: 2018-11-06

## 2018-11-06 RX ORDER — PREGABALIN 225 MG/1
CAPSULE ORAL
Qty: 60 CAPSULE | Refills: 0 | Status: SHIPPED | OUTPATIENT
Start: 2018-11-06 | End: 2021-08-16

## 2018-11-06 RX ORDER — CYCLOBENZAPRINE HCL 10 MG
TABLET ORAL
Qty: 30 TABLET | Refills: 0 | Status: SHIPPED | OUTPATIENT
Start: 2018-11-06 | End: 2018-12-11 | Stop reason: SDUPTHER

## 2018-12-03 DIAGNOSIS — M79.7 FIBROMYALGIA: ICD-10-CM

## 2018-12-11 RX ORDER — CYCLOBENZAPRINE HCL 10 MG
TABLET ORAL
Qty: 30 TABLET | Refills: 0 | Status: SHIPPED | OUTPATIENT
Start: 2018-12-11

## 2018-12-11 RX ORDER — PREGABALIN 225 MG/1
CAPSULE ORAL
Qty: 60 CAPSULE | Refills: 0 | OUTPATIENT
Start: 2018-12-11 | End: 2019-01-10

## 2019-01-27 ENCOUNTER — HOSPITAL ENCOUNTER (EMERGENCY)
Age: 39
Discharge: HOME | End: 2019-01-27
Attending: EMERGENCY MEDICINE
Payer: COMMERCIAL

## 2019-01-27 VITALS
HEART RATE: 105 BPM | OXYGEN SATURATION: 95 % | BODY MASS INDEX: 28.35 KG/M2 | TEMPERATURE: 97.4 F | WEIGHT: 160 LBS | DIASTOLIC BLOOD PRESSURE: 84 MMHG | HEIGHT: 63 IN | RESPIRATION RATE: 20 BRPM | SYSTOLIC BLOOD PRESSURE: 123 MMHG

## 2019-01-27 DIAGNOSIS — M54.5 LOW BACK PAIN, UNSPECIFIED BACK PAIN LATERALITY, UNSPECIFIED CHRONICITY, WITH SCIATICA PRESENCE UNSPECIFIED: Primary | ICD-10-CM

## 2019-01-27 PROCEDURE — 99283 EMERGENCY DEPT VISIT LOW MDM: CPT

## 2019-01-27 PROCEDURE — 6360000002 HC RX W HCPCS: Performed by: ANESTHESIOLOGY

## 2019-01-27 PROCEDURE — 96374 THER/PROPH/DIAG INJ IV PUSH: CPT

## 2019-01-27 PROCEDURE — 6370000000 HC RX 637 (ALT 250 FOR IP): Performed by: ANESTHESIOLOGY

## 2019-01-27 RX ORDER — ONDANSETRON 2 MG/ML
4 INJECTION INTRAMUSCULAR; INTRAVENOUS ONCE
Status: COMPLETED | OUTPATIENT
Start: 2019-01-27 | End: 2019-01-27

## 2019-01-27 RX ORDER — IBUPROFEN 400 MG/1
400 TABLET ORAL ONCE
Status: COMPLETED | OUTPATIENT
Start: 2019-01-27 | End: 2019-01-27

## 2019-01-27 RX ORDER — ACETAMINOPHEN 500 MG
1000 TABLET ORAL ONCE
Status: COMPLETED | OUTPATIENT
Start: 2019-01-27 | End: 2019-01-27

## 2019-01-27 RX ADMIN — IBUPROFEN 400 MG: 400 TABLET ORAL at 22:23

## 2019-01-27 RX ADMIN — ACETAMINOPHEN 1000 MG: 500 TABLET, FILM COATED ORAL at 22:23

## 2019-01-27 RX ADMIN — ONDANSETRON 4 MG: 2 INJECTION INTRAMUSCULAR; INTRAVENOUS at 22:23

## 2019-01-27 ASSESSMENT — PAIN SCALES - GENERAL
PAINLEVEL_OUTOF10: 10
PAINLEVEL_OUTOF10: 10

## 2019-01-27 ASSESSMENT — PAIN DESCRIPTION - LOCATION: LOCATION: BACK;HEAD

## 2019-01-27 ASSESSMENT — PAIN DESCRIPTION - PAIN TYPE: TYPE: ACUTE PAIN

## 2021-02-01 ENCOUNTER — APPOINTMENT (OUTPATIENT)
Dept: GENERAL RADIOLOGY | Age: 41
End: 2021-02-01
Payer: COMMERCIAL

## 2021-02-01 ENCOUNTER — HOSPITAL ENCOUNTER (EMERGENCY)
Age: 41
Discharge: HOME | End: 2021-02-01
Attending: EMERGENCY MEDICINE
Payer: COMMERCIAL

## 2021-02-01 VITALS
DIASTOLIC BLOOD PRESSURE: 83 MMHG | WEIGHT: 160 LBS | HEART RATE: 94 BPM | TEMPERATURE: 97.9 F | RESPIRATION RATE: 18 BRPM | OXYGEN SATURATION: 99 % | SYSTOLIC BLOOD PRESSURE: 119 MMHG | HEIGHT: 65 IN | BODY MASS INDEX: 26.66 KG/M2
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
## 2021-02-01 PROCEDURE — 73110 X-RAY EXAM OF WRIST: CPT

## 2021-02-01 PROCEDURE — 73130 X-RAY EXAM OF HAND: CPT

## 2021-02-01 PROCEDURE — 99284 EMERGENCY DEPT VISIT MOD MDM: CPT

## 2021-02-01 PROCEDURE — 6370000000 HC RX 637 (ALT 250 FOR IP): Performed by: STUDENT IN AN ORGANIZED HEALTH CARE EDUCATION/TRAINING PROGRAM

## 2021-02-01 RX ORDER — OXYCODONE HYDROCHLORIDE 5 MG/1
5 TABLET ORAL ONCE
Status: COMPLETED | OUTPATIENT
Start: 2021-02-01 | End: 2021-02-01

## 2021-02-01 RX ORDER — LIDOCAINE 4 G/G
1 PATCH TOPICAL DAILY
Status: DISCONTINUED | OUTPATIENT
Start: 2021-02-01 | End: 2021-02-01 | Stop reason: HOSPADM

## 2021-02-01 RX ADMIN — OXYCODONE HYDROCHLORIDE 5 MG: 5 TABLET ORAL at 19:58

## 2021-02-01 ASSESSMENT — ENCOUNTER SYMPTOMS
ABDOMINAL PAIN: 0
SHORTNESS OF BREATH: 0
VOMITING: 0
NAUSEA: 0
SINUS PRESSURE: 0

## 2021-02-02 NOTE — ED NOTES
Pt discharged to home. Pt given discharge instructions and verbalized understanding. Pt ambulatory at discharge and left without incident.       Reggie Chaudhary RN  02/01/21 2033

## 2021-02-02 NOTE — ED PROVIDER NOTES
[address] ENCOUNTER          EM RESIDENT NOTE       Date of evaluation: 2/1/2021    Chief Complaint     Fall (slipped on ice last night and landed on right side), Arm Pain, and Back Pain      History of Present Illness     Patricia Blanco is a 36 y.o. female PMH fibromyalgia, degenerative disc disease/herinated lumbar discs s/p repair who presents with right wrist and neck pain s/p fall. Last night, she was leaving her father's house when she slipped on some ice and landed on her right arm. She did not land on an outstretched hand. She did not hit her head, no loss of consciousness. She has some right neck pain and right wrist pain that is been constant since the fall. The neck pain is sharp, tingling, and radiates from the top of her right lateral neck down towards her shoulder. Her wrist pain is sharp, constant, worse with any movement. She is able to move her right elbow without any concerns. She has difficulty moving her right hand, due to her wrist pain. No headaches, dizziness, lightheadedness, nausea, vomiting, or any other pain. She has been able to walk with some right hip tingling, which is chronic for her. Review of Systems     Review of Systems   Constitutional: Negative for fever. HENT: Negative for congestion, ear pain and sinus pressure. Eyes: Negative for visual disturbance. Respiratory: Negative for shortness of breath. Cardiovascular: Negative for chest pain. Gastrointestinal: Negative for abdominal pain, nausea and vomiting. Musculoskeletal: Positive for arthralgias (Right wrist), gait problem (Right lateral leg numbness, chronic) and neck pain (Lateral right). Skin: Negative for wound. Neurological: Negative for dizziness, light-headedness and headaches. Psychiatric/Behavioral: Negative for confusion. All other systems reviewed and are negative.       Past Medical, Surgical, Family, and Social History     She has a past medical history of Anxiety, Bipolar affective disorder ([facility] [address] CHF (congestive heart failure) (HCC), Chronic back pain, DDD (degenerative disc disease), DDD (degenerative disc disease), cervical, DJD (degenerative joint disease), multiple sites, Fibromyalgia, GERD (gastroesophageal reflux disease), Headache, Insomnia, Osteoarthritis, and Tendinitis. She has a past surgical history that includes Spine surgery;  section; Cholecystectomy; Tonsillectomy; Carpal tunnel release (Bilateral); and Elbow surgery (Left). Her family history includes Alcohol Abuse in her mother; Arthritis in her maternal grandmother and mother; Heart Disease in her maternal grandmother, maternal uncle, and mother; High Blood Pressure in her mother; Sleep Apnea in her mother. She reports that she has been smoking cigarettes. She has a 5.00 pack-year smoking history. She has never used smokeless tobacco. She reports that she does not drink alcohol or use drugs. Medications     Previous Medications    CYCLOBENZAPRINE (FLEXERIL) 10 MG TABLET    TAKE 1 TABLET BY MOUTH THREE TIMES DAILY AS NEEDED FOR MUSCLE SPASMS    LYRICA 225 MG CAPSULE    TAKE 1 CAPSULE BY MOUTH TWICE DAILY       Allergies     She is allergic to oxytocin; diclofenac; aspirin; ibuprofen; nsaids; phenergan [promethazine hcl]; promethazine; and tylenol [acetaminophen]. Physical Exam     INITIAL VITALS: BP: 119/83, Temp: 97.9 °F (36.6 °C), Pulse: 94, Resp: 18, SpO2: 99 %   Physical Exam  Vitals signs and nursing note reviewed. Constitutional:       General: She is not in acute distress. HENT:      Head: Normocephalic and atraumatic. Eyes:      Extraocular Movements: Extraocular movements intact. Conjunctiva/sclera: Conjunctivae normal.   Neck:      Musculoskeletal: Normal range of motion and neck supple. Muscular tenderness (Right lateral neck, no midline tenderness) present. No neck rigidity. Cardiovascular:      Rate and Rhythm: Normal rate and regular rhythm. ULTRASOUND:  None    RECENT VITALS:   ,  ,  , ,       Procedures     None    ED Course     Nursing Notes, Past Medical Hx, Past Surgical Hx, Social Hx, Allergies, and Family Hx were reviewed. The patient was given the followingmedications:  No orders of the defined types were placed in this encounter. CONSULTS:  None    MEDICAL DECISION MAKING / ASSESSMENT / Yovani Friday is a 36 y.o. female presenting for right lateral neck and right wrist pain s/p fall. Physical exam concerning for right lateral neck tightness and right wrist tenderness. No snuffbox tenderness. Right wrist and right hand x-ray show no acute abnormalities. Patient given oxycodone and lidocaine patch for pain. Patient is following up with her pain management physician on Friday, appointment already scheduled. Patient will call her PCP and pain management physicians as needed for worsening pain. At this time, patient deemed appropriate for discharge. Patient given return precautions. All patient's questions answered satisfactorily. Patient discharged in stable condition. This patient was also evaluated by the attending physician. All care plans werediscussed and agreed upon. Clinical Impression     1.  Fall, initial encounter        Disposition     PATIENT REFERRED TO:      Schedule an appointment as soon as possible for a visit in 5 days  Follow up within 5 days, Return to ED sooner if symptoms worsen    [facility]  [address] [facility]          DISCHARGE MEDICATIONS:  Discharge Medication List as of 2/1/2021  8:30 PM          DISPOSITION  discharge       Hakan Adler MD  Resident  02/01/21 1990

## 2021-02-02 NOTE — ED NOTES
Pt presents to the ED from home ambulatory with c/o right side of body pain (right arm, neck, back, and right leg pain) after slipping on ice last night and landing on her right side. Pt denies hitting her head or loc. Pt reports hx of chronic back problems and take lyrica, percocet, and flexeril for this however has been out of it for 1 week.      Marybel Maldonado RN  02/01/21 0141

## 2021-02-22 ENCOUNTER — TELEPHONE (OUTPATIENT)
Dept: FAMILY MEDICINE CLINIC | Age: 41
End: 2021-02-22

## 2021-02-22 NOTE — TELEPHONE ENCOUNTER
Unfortunately I am not able to take Eh Daniel as a patient here. You can give the [facility] [employer]  phone number.

## 2021-02-22 NOTE — TELEPHONE ENCOUNTER
PT came in today wanting to schedule with Dr. Kim Javed. States he informed her friend Angelina Man that he would take her in potentially the end of feb/beginning of march. I looked into the friends chart and did not see this information anywhere in the chart. Pt states needing a pcp for a lot of referrals and hasn't had a pcp in a bit. Please advise, thanks.

## 2021-05-03 ENCOUNTER — VIRTUAL VISIT (OUTPATIENT)
Dept: INTERNAL MEDICINE CLINIC | Age: 41
End: 2021-05-03
Payer: COMMERCIAL

## 2021-05-03 DIAGNOSIS — G89.29 CHRONIC LOW BACK PAIN, UNSPECIFIED BACK PAIN LATERALITY, UNSPECIFIED WHETHER SCIATICA PRESENT: ICD-10-CM

## 2021-05-03 DIAGNOSIS — Z12.31 ENCOUNTER FOR SCREENING MAMMOGRAM FOR MALIGNANT NEOPLASM OF BREAST: Primary | ICD-10-CM

## 2021-05-03 DIAGNOSIS — Z87.891 HISTORY OF TOBACCO USE: ICD-10-CM

## 2021-05-03 DIAGNOSIS — F51.01 PRIMARY INSOMNIA: ICD-10-CM

## 2021-05-03 DIAGNOSIS — M79.7 FIBROMYALGIA: ICD-10-CM

## 2021-05-03 DIAGNOSIS — E55.9 VITAMIN D DEFICIENCY: ICD-10-CM

## 2021-05-03 DIAGNOSIS — Z00.00 HEALTHCARE MAINTENANCE: ICD-10-CM

## 2021-05-03 DIAGNOSIS — F31.9 BIPOLAR AFFECTIVE DISORDER, REMISSION STATUS UNSPECIFIED (HCC): ICD-10-CM

## 2021-05-03 DIAGNOSIS — M54.50 CHRONIC LOW BACK PAIN, UNSPECIFIED BACK PAIN LATERALITY, UNSPECIFIED WHETHER SCIATICA PRESENT: ICD-10-CM

## 2021-05-03 DIAGNOSIS — Z13.220 SCREENING, LIPID: ICD-10-CM

## 2021-05-03 DIAGNOSIS — G43.909 MIGRAINE WITHOUT STATUS MIGRAINOSUS, NOT INTRACTABLE, UNSPECIFIED MIGRAINE TYPE: ICD-10-CM

## 2021-05-03 DIAGNOSIS — G47.33 OBSTRUCTIVE SLEEP APNEA SYNDROME: ICD-10-CM

## 2021-05-03 PROCEDURE — G8427 DOCREV CUR MEDS BY ELIG CLIN: HCPCS | Performed by: INTERNAL MEDICINE

## 2021-05-03 PROCEDURE — 99214 OFFICE O/P EST MOD 30 MIN: CPT | Performed by: INTERNAL MEDICINE

## 2021-05-03 RX ORDER — ERGOCALCIFEROL 1.25 MG/1
50000 CAPSULE ORAL WEEKLY
Qty: 12 CAPSULE | Refills: 1 | Status: SHIPPED | OUTPATIENT
Start: 2021-05-03

## 2021-05-03 RX ORDER — AMITRIPTYLINE HYDROCHLORIDE 10 MG/1
10 TABLET, FILM COATED ORAL NIGHTLY
COMMUNITY
Start: 2021-04-23 | End: 2021-05-18 | Stop reason: ALTCHOICE

## 2021-05-03 RX ORDER — UREA 10 %
1 LOTION (ML) TOPICAL NIGHTLY PRN
Qty: 30 TABLET | Refills: 1 | Status: SHIPPED | OUTPATIENT
Start: 2021-05-03

## 2021-05-03 SDOH — ECONOMIC STABILITY: TRANSPORTATION INSECURITY
IN THE PAST 12 MONTHS, HAS THE LACK OF TRANSPORTATION KEPT YOU FROM MEDICAL APPOINTMENTS OR FROM GETTING MEDICATIONS?: NO

## 2021-05-03 SDOH — ECONOMIC STABILITY: INCOME INSECURITY: HOW HARD IS IT FOR YOU TO PAY FOR THE VERY BASICS LIKE FOOD, HOUSING, MEDICAL CARE, AND HEATING?: NOT HARD AT ALL

## 2021-05-03 SDOH — ECONOMIC STABILITY: FOOD INSECURITY: WITHIN THE PAST 12 MONTHS, YOU WORRIED THAT YOUR FOOD WOULD RUN OUT BEFORE YOU GOT MONEY TO BUY MORE.: NEVER TRUE

## 2021-05-03 ASSESSMENT — ENCOUNTER SYMPTOMS: BACK PAIN: 1

## 2021-05-03 NOTE — PROGRESS NOTES
Pt present for est NP. Pt also needs referral for [facility]. Was referred by pain clinic but was instructed to have referral completed by PCP. Pt also needs Mammogram. No vitals for visit.     VV confirmed via cell phone @ [phone]

## 2021-05-03 NOTE — PROGRESS NOTES
[facility]  5/3/2021      TELEHEALTH EVALUATION -- Audio/Visual (During UD-34 public health emergency)    Easton Sandra (:  1980) has requested an audio/video evaluation for the following concern(s):    Chief Complaint   Patient presents with   Josephine Edgar Establish Care    Referral - Steve GREGG  36years old woman with history of HILDA (not on CPAP), fibromyalgia, chronic back pain s/p 2 back surgeries, insomnia, bipolar versus PTSD (different diagnosis by different psychiatrist), establishing care. ROS  Review of Systems   Musculoskeletal: Positive for back pain. Neurological: Positive for headaches. Psychiatric/Behavioral: Positive for sleep disturbance. HISTORIES   Current Outpatient Medications on File Prior to Visit   Medication Sig Dispense Refill    amitriptyline (ELAVIL) 10 MG tablet Take 10 mg by mouth nightly      cyclobenzaprine (FLEXERIL) 10 MG tablet TAKE 1 TABLET BY MOUTH THREE TIMES DAILY AS NEEDED FOR MUSCLE SPASMS 30 tablet 0    LYRICA 225 MG capsule TAKE 1 CAPSULE BY MOUTH TWICE DAILY 60 capsule 0     No current facility-administered medications on file prior to visit. Allergies   Allergen Reactions    Oxytocin Shortness Of Breath and Other (See Comments)     congestion heart failure   Caused heart failure  CHF    Diclofenac     Nickel Swelling     In earrings- caused ear to swell    Aspirin Nausea And Vomiting    Ibuprofen Itching    Nsaids Nausea And Vomiting    Phenergan [Promethazine Hcl] Nausea And Vomiting    Promethazine Nausea And Vomiting    Tylenol [Acetaminophen] Nausea And Vomiting       Past Medical History:   Diagnosis Date    Anxiety     Bipolar affective disorder ([address] 3/14/2018    CHF (congestive heart failure) ([facility] [address]     pt states preg induced.     Chronic back pain     DDD (degenerative disc disease)     DDD (degenerative disc disease), cervical     DJD (degenerative joint disease), multiple Emotionally abused: Not on file     Physically abused: Not on file     Forced sexual activity: Not on file   Other Topics Concern    Not on file   Social History Narrative    Not on file        Family History   Problem Relation Age of Onset    High Blood Pressure Mother     Heart Disease Mother         [facility] Arthritis Mother     Alcohol Abuse Mother     Sleep Apnea Mother     Liver Cancer Mother         alcohol     Heart Disease Maternal Grandmother     Arthritis Maternal Grandmother     Cancer Father     Diabetes Father     Hypertension Father     Heart Failure Father     Lung Cancer Paternal Grandmother     Lung Cancer Paternal Grandfather     Heart Disease Maternal Uncle     Liver Disease Brother         fattu liver       PE:  No flowsheet data found. There were no vitals filed for this visit. Constitutional: [x] Appears well-developed and well-nourished [x] No apparent distress      [] Abnormal-   Mental status  [x] Alert and awake  [x] Oriented to person/place/time [x]Able to follow commands      Eyes:  EOM    [x]  Normal  [] Abnormal-  Sclera  [x]  Normal  [] Abnormal -         Discharge [x]  None visible  [] Abnormal -    HENT:   [x] Normocephalic, atraumatic.   [] Abnormal   [x] Mouth/Throat: Mucous membranes are moist.     External Ears [x] Normal  [] Abnormal-     Neck: [x] No visualized mass     Pulmonary/Chest: [x] Respiratory effort normal.  [x] No visualized signs of difficulty breathing or respiratory distress        [] Abnormal-      Musculoskeletal:   [] Normal gait with no signs of ataxia         [x] Normal range of motion of neck        [] Abnormal-       Neurological:        [x] No Facial Asymmetry (Cranial nerve 7 motor function) (limited exam to video visit)          [x] No gaze palsy        [] Abnormal-         Skin:        [x] No significant exanthematous lesions or discoloration noted on facial skin         [] Abnormal-            Psychiatric:       [x] Normal capsule     Sig: Take 1 capsule by mouth once a week     Dispense:  12 capsule     Refill:  1      There are no discontinued medications. No follow-ups on file. Williams Norris MD      Bonnie Rodriguez is a 36 y.o. female being evaluated by a Virtual Visit (video visit) encounter to address concerns as mentioned above. A caregiver was present when appropriate. Due to this being a TeleHealth encounter (During QZEEM-17 public health emergency), evaluation of the following organ systems was limited: Vitals/Constitutional/EENT/Resp/CV/GI//MS/Neuro/Skin/Heme-Lymph-Imm. Pursuant to the emergency declaration under the [address] [place] [address] authority and the [employer] Act, this Virtual Visit was conducted with patient's (and/or legal guardian's) consent, to reduce the patient's risk of exposure to COVID-19 and provide necessary medical care. The patient (and/or legal guardian) has also been advised to contact this office for worsening conditions or problems, and seek emergency medical treatment and/or call 911 if deemed necessary. Patient identification was verified at the start of the visit: Yes    Total time spent on this encounter: not billed by time    Services were provided through a video synchronous discussion virtually to substitute for in-person clinic visit. Patient and provider were located at their individual homes. --Williams Norris MD on 5/3/2021 at 3:31 PM    An electronic signature was used to authenticate this note. This dictation was generated by voice recognition computer software. Although all attempts are made to edit the dictation for accuracy, there may be errors in the transcription that are not intended.

## 2021-05-03 NOTE — ASSESSMENT & PLAN NOTE
-Reports getting 5 hours of sleep total at most, usually wakes up after 3 hours and stays up for at least 1-1.5 hours. \"Unable to shut my brain down\"  -Was started on amitriptyline by her rheumatologist last week  -Add melatonin 1 mg  -I think she will benefit greatly from CBT-I, referred to Dr. Kendrick Duran  -Try guided sleep meditation.   She finds rainy night better for her sleep, tried putting on a vein/thunder background noises which did not help the same way

## 2021-05-03 NOTE — ASSESSMENT & PLAN NOTE
Carries diagnosis of bipolar since her early 25s, but reportedly last psychiatrist recently thought she has PTSD  -Was on lithium, Seroquel, Prozac, Cymbalta, Zoloft in the past.  Reports not on treatment for over 10 years now.   -Recently started amitriptyline for sleep by her rheumatologist

## 2021-05-18 ENCOUNTER — HOSPITAL ENCOUNTER (EMERGENCY)
Age: 41
Discharge: HOME | End: 2021-05-18
Attending: EMERGENCY MEDICINE
Payer: COMMERCIAL

## 2021-05-18 ENCOUNTER — APPOINTMENT (OUTPATIENT)
Dept: CT IMAGING | Age: 41
End: 2021-05-18
Payer: COMMERCIAL

## 2021-05-18 VITALS
DIASTOLIC BLOOD PRESSURE: 85 MMHG | RESPIRATION RATE: 18 BRPM | BODY MASS INDEX: 3.16 KG/M2 | SYSTOLIC BLOOD PRESSURE: 138 MMHG | TEMPERATURE: 98.8 F | WEIGHT: 19 LBS | OXYGEN SATURATION: 99 % | HEART RATE: 109 BPM
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
## 2021-05-18 DIAGNOSIS — R91.1 INCIDENTAL PULMONARY NODULE, LESS THAN OR EQUAL TO 3MM: ICD-10-CM

## 2021-05-18 DIAGNOSIS — S00.83XA FACIAL CONTUSION, INITIAL ENCOUNTER: ICD-10-CM

## 2021-05-18 DIAGNOSIS — S20.219A CONTUSION OF CHEST WALL, UNSPECIFIED LATERALITY, INITIAL ENCOUNTER: ICD-10-CM

## 2021-05-18 PROCEDURE — 71250 CT THORAX DX C-: CPT

## 2021-05-18 PROCEDURE — 99283 EMERGENCY DEPT VISIT LOW MDM: CPT

## 2021-05-18 PROCEDURE — 70486 CT MAXILLOFACIAL W/O DYE: CPT

## 2021-05-18 PROCEDURE — 6370000000 HC RX 637 (ALT 250 FOR IP): Performed by: EMERGENCY MEDICINE

## 2021-05-18 RX ORDER — HYDROCODONE BITARTRATE AND ACETAMINOPHEN 5; 325 MG/1; MG/1
1 TABLET ORAL EVERY 6 HOURS PRN
Qty: 10 TABLET | Refills: 0 | Status: SHIPPED | OUTPATIENT
Start: 2021-05-18 | End: 2021-05-21

## 2021-05-18 RX ORDER — PREGABALIN 150 MG/1
300 CAPSULE ORAL 2 TIMES DAILY
COMMUNITY
End: 2021-08-12 | Stop reason: SDUPTHER

## 2021-05-18 RX ORDER — HYDROCODONE BITARTRATE AND ACETAMINOPHEN 5; 325 MG/1; MG/1
1 TABLET ORAL ONCE
Status: COMPLETED | OUTPATIENT
Start: 2021-05-18 | End: 2021-05-18

## 2021-05-18 RX ADMIN — HYDROCODONE BITARTRATE AND ACETAMINOPHEN 1 TABLET: 5; 325 TABLET ORAL at 20:37

## 2021-05-18 ASSESSMENT — PAIN DESCRIPTION - LOCATION: LOCATION: FACE;CHEST

## 2021-05-18 ASSESSMENT — PAIN SCALES - GENERAL
PAINLEVEL_OUTOF10: 6
PAINLEVEL_OUTOF10: 8
PAINLEVEL_OUTOF10: 8

## 2021-05-18 ASSESSMENT — PAIN DESCRIPTION - DESCRIPTORS: DESCRIPTORS: SHOOTING;STABBING

## 2021-05-18 ASSESSMENT — PAIN DESCRIPTION - FREQUENCY: FREQUENCY: CONTINUOUS
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
## 2021-05-19 NOTE — ED NOTES
Patient prepared for and ready to be discharged. Patient discharged at this time in no acute distress after verbalizing understanding of discharge instructions. Patient left after receiving After Visit Summary instructions.         Trav Schneider RN  05/18/21 [id number]

## 2021-06-02 PROBLEM — Z00.00 HEALTHCARE MAINTENANCE: Status: RESOLVED | Noted: 2021-05-03 | Resolved: 2021-06-02

## 2021-07-21 ENCOUNTER — HOSPITAL ENCOUNTER (OUTPATIENT)
Dept: CT IMAGING | Age: 41
Discharge: HOME | End: 2021-07-21
Payer: COMMERCIAL

## 2021-07-21 ENCOUNTER — HOSPITAL ENCOUNTER (OUTPATIENT)
Dept: MRI IMAGING | Age: 41
Discharge: HOME | End: 2021-07-21
Payer: COMMERCIAL

## 2021-07-21 DIAGNOSIS — M48.061 SPINAL STENOSIS OF LUMBAR REGION, UNSPECIFIED WHETHER NEUROGENIC CLAUDICATION PRESENT: ICD-10-CM

## 2021-07-21 DIAGNOSIS — M51.36 DEGENERATIVE DISC DISEASE, LUMBAR: ICD-10-CM

## 2021-07-21 DIAGNOSIS — M43.26 FUSION OF LUMBAR SPINE: ICD-10-CM

## 2021-07-21 DIAGNOSIS — M54.50 LOW BACK PAIN, UNSPECIFIED BACK PAIN LATERALITY, UNSPECIFIED CHRONICITY, UNSPECIFIED WHETHER SCIATICA PRESENT: ICD-10-CM

## 2021-07-21 PROCEDURE — 72131 CT LUMBAR SPINE W/O DYE: CPT

## 2021-07-21 PROCEDURE — 72148 MRI LUMBAR SPINE W/O DYE: CPT

## 2021-08-05 ENCOUNTER — TELEPHONE (OUTPATIENT)
Dept: INTERNAL MEDICINE CLINIC | Age: 41
End: 2021-08-05

## 2021-08-05 NOTE — TELEPHONE ENCOUNTER
----- Message from Bruno Aaronmelissa sent at 8/5/2021 12:06 PM EDT -----  Subject: Appointment Request    Reason for Call: Routine Pre-Op    QUESTIONS  Type of Appointment? Established Patient  Reason for appointment request? No appointments available during search  Additional Information for Provider? Derrell Wilkins needs a preop appointment   before surgery on 8/31/2021. She would like to see Dr. Skye House but no   appointments are showing available. When can she come in for this?  ---------------------------------------------------------------------------  --------------  CALL BACK INFO  What is the best way for the office to contact you? OK to leave message on   voicemail  Preferred Call Back Phone Number? [phone]  ---------------------------------------------------------------------------  --------------  SCRIPT ANSWERS  Relationship to Patient? Self  Do you have questions for your provider that need to be answered prior to   scheduling your pre-op appointment? No  Have you been diagnosed with, awaiting test results for, or told that you   are suspected of having COVID-19 (Coronavirus)? (If patient has tested   negative or was tested as a requirement for work, school, or travel and   not based on symptoms, answer no)? No  Do you currently have flu-like symptoms including fever or chills, cough,   shortness of breath, difficulty breathing, or new loss of taste or smell? No  Have you had close contact with someone with COVID-19 in the last 14 days? No  (Service Expert  click yes below to proceed with [place] As Usual   Scheduling)?  Yes

## 2021-08-11 DIAGNOSIS — M79.7 FIBROMYALGIA: Primary | ICD-10-CM

## 2021-08-11 NOTE — TELEPHONE ENCOUNTER
Patient called in requesting refill for the following medication:      pregabalin (LYRICA) 150 MG capsule     Her rheumatologist cant refill until September because that's when she goes and see Dr. Manda Calloway who will be taking over from her previous rheumatologist. Wants to know if Dr. Roz Hayes can refill. [facility], [facility]  [phone]    Pls advise.

## 2021-08-12 ENCOUNTER — TELEPHONE (OUTPATIENT)
Dept: INTERNAL MEDICINE CLINIC | Age: 41
End: 2021-08-12

## 2021-08-12 RX ORDER — PREGABALIN 150 MG/1
300 CAPSULE ORAL 2 TIMES DAILY
Qty: 120 CAPSULE | Refills: 0 | OUTPATIENT
Start: 2021-08-12 | End: 2021-09-07

## 2021-08-12 NOTE — TELEPHONE ENCOUNTER
Pt called into office wanting an update on her medications. She is starting to feel the symptoms for her fibromyalgia. pregabalin (LYRICA) 150 MG capsule     [facility], [address] [facility]    Please advise.

## 2021-08-16 ENCOUNTER — OFFICE VISIT (OUTPATIENT)
Dept: INTERNAL MEDICINE CLINIC | Age: 41
End: 2021-08-16
Payer: COMMERCIAL

## 2021-08-16 VITALS
WEIGHT: 192 LBS | HEIGHT: 65 IN | DIASTOLIC BLOOD PRESSURE: 70 MMHG | BODY MASS INDEX: 31.99 KG/M2 | SYSTOLIC BLOOD PRESSURE: 120 MMHG

## 2021-08-16 DIAGNOSIS — M54.50 CHRONIC BILATERAL LOW BACK PAIN WITHOUT SCIATICA: ICD-10-CM

## 2021-08-16 DIAGNOSIS — M79.7 FIBROMYALGIA: ICD-10-CM

## 2021-08-16 DIAGNOSIS — G43.909 MIGRAINE WITHOUT STATUS MIGRAINOSUS, NOT INTRACTABLE, UNSPECIFIED MIGRAINE TYPE: ICD-10-CM

## 2021-08-16 DIAGNOSIS — Z01.818 PRE-OP EXAMINATION: Primary | ICD-10-CM

## 2021-08-16 DIAGNOSIS — G89.29 CHRONIC BILATERAL LOW BACK PAIN WITHOUT SCIATICA: ICD-10-CM

## 2021-08-16 DIAGNOSIS — J31.0 CHRONIC RHINITIS: ICD-10-CM

## 2021-08-16 PROCEDURE — 99214 OFFICE O/P EST MOD 30 MIN: CPT | Performed by: HOSPITALIST

## 2021-08-16 PROCEDURE — G8427 DOCREV CUR MEDS BY ELIG CLIN: HCPCS | Performed by: HOSPITALIST

## 2021-08-16 PROCEDURE — 1036F TOBACCO NON-USER: CPT | Performed by: HOSPITALIST

## 2021-08-16 PROCEDURE — 93000 ELECTROCARDIOGRAM COMPLETE: CPT | Performed by: HOSPITALIST

## 2021-08-16 PROCEDURE — G8417 CALC BMI ABV UP PARAM F/U: HCPCS | Performed by: HOSPITALIST

## 2021-08-16 ASSESSMENT — PATIENT HEALTH QUESTIONNAIRE - PHQ9
SUM OF ALL RESPONSES TO PHQ QUESTIONS 1-9: 0
SUM OF ALL RESPONSES TO PHQ QUESTIONS 1-9: 0
2. FEELING DOWN, DEPRESSED OR HOPELESS: 0
SUM OF ALL RESPONSES TO PHQ9 QUESTIONS 1 & 2: 0
SUM OF ALL RESPONSES TO PHQ QUESTIONS 1-9: 0
1. LITTLE INTEREST OR PLEASURE IN DOING THINGS: 0

## 2021-08-16 ASSESSMENT — ENCOUNTER SYMPTOMS
EYES NEGATIVE: 1
RESPIRATORY NEGATIVE: 1
BACK PAIN: 1

## 2021-08-16 NOTE — PROGRESS NOTES
Pre-Op Examination    :  Pavan Gloria                                               : 1980  Age: 36 y.o. MRN: [id number]  Date : 2021    Referring Physician: Luis Montalvo MD, neurosurgery    Procedure: Left L4-L5 lumbar lateral interbody fusion with pedicle screw fixation scheduled for 8/3/1/2021    HISTORY OF PRESENT ILLNESS:   The patient is a 36 y.o. female who presents for preoperative examination. Has chronic lower back pain. Had her 1st lower back surgery ( spinal fusion at L5-S1) in . It was redone in . Reports significant lower back pain since 2019. Takes Lyrica for pain control. Planned anesthesia:  general  Known anesthesia problems: no  Bleeding risk:  low  Personal or FH of DVT/PE:  no  Patient objection toreceiving blood products: no      Past Medical History:        Diagnosis Date    Anxiety     Bipolar affective disorder ([facility] [address] 3/14/2018    CHF (congestive heart failure) ([facility] [address]     pt states preg induced.     Chronic back pain     DDD (degenerative disc disease)     DDD (degenerative disc disease), cervical     DJD (degenerative joint disease), multiple sites     Fibromyalgia     GERD (gastroesophageal reflux disease)     Headache     Insomnia     Osteoarthritis     Tendinitis        Past Surgical History:        Procedure Laterality Date    CARPAL TUNNEL RELEASE Bilateral      SECTION      CHOLECYSTECTOMY      ELBOW SURGERY Left     cubital tunnel release    SPINE SURGERY      TONSILLECTOMY         Family History:       Problem Relation Age of Onset    High Blood Pressure Mother     Heart Disease Mother         CHF   Latisha See Arthritis Mother     Alcohol Abuse Mother     Sleep Apnea Mother     Liver Cancer Mother         alcohol     Heart Disease Maternal Grandmother     Arthritis Maternal Grandmother     Cancer Father     Diabetes Father     Hypertension Father     Heart Failure Father     Lung Cancer Paternal Grandmother     Lung Cancer Paternal Grandfather     Heart Disease Maternal Uncle     Liver Disease Brother         vivek liver       Social History:   TOBACCO:   reports that she quit smoking about 7 months ago. Her smoking use included cigarettes. She started smoking about 27 years ago. She has a 6.75 pack-year smoking history. She has never used smokeless tobacco.  ETOH:   reports no history of alcohol use. OCCUPATION:   at [employer].  with one 17 yo daughter. Takes care of her ADLs. Quit smoking about 8 months ago    Allergies:  Oxytocin, Diclofenac, Nickel, Aspirin, Ibuprofen, Nsaids, Phenergan [promethazine hcl], Promethazine, and Tylenol [acetaminophen]    Current Medications:    Prior to Admission medications    Medication Sig Start Date End Date Taking? Authorizing Provider   pregabalin (LYRICA) 150 MG capsule Take 2 capsules by mouth 2 times daily for 30 days. 8/12/21 9/11/21 Yes Christiano Morin MD   melatonin 1 MG tablet Take 1 tablet by mouth nightly as needed for Sleep 5/3/21  Yes Oma Thompson MD   vitamin D (ERGOCALCIFEROL) 1.25 MG (06729 UT) CAPS capsule Take 1 capsule by mouth once a week 5/3/21  Yes Oma Thompson MD   cyclobenzaprine (FLEXERIL) 10 MG tablet TAKE 1 TABLET BY MOUTH THREE TIMES DAILY AS NEEDED FOR MUSCLE SPASMS 12/11/18  Yes Shaq Johnson DO   LYRICA 225 MG capsule TAKE 1 CAPSULE BY MOUTH TWICE DAILY 11/6/18 8/16/21 Yes Shaq Johnson DO       REVIEW OF SYSTEMS:  Review of Systems   Constitutional: Positive for fatigue. HENT: Positive for congestion (secondary to deviated sepptum amd seasonal allergies). Eyes: Negative. Respiratory: Negative. Cardiovascular: Negative. Gastrointestinal:        +heartburn   Endocrine: Positive for cold intolerance (mild). Genitourinary: Negative. Musculoskeletal: Positive for arthralgias (both hands), back pain (chronic lower back pain) and neck pain (some bilateral lower neck pain). Skin: Negative.     Neurological: Positive for numbness (lateral surface of her R lower extremity). Psychiatric/Behavioral: Positive for dysphoric mood (multiple family issues) and sleep disturbance. Physical Exam:      Vitals: /70 (Site: Left Upper Arm, Position: Sitting, Cuff Size: Large Adult)   Ht 5' 5\" (1.651 m)   Wt 192 lb (87.1 kg)   LMP 07/25/2021 (Approximate)   BMI 31.95 kg/m²     Body mass index is 31.95 kg/m². Wt Readings from Last 3 Encounters:   08/16/21 192 lb (87.1 kg)   05/18/21 19 lb (8.618 kg)   02/01/21 160 lb (72.6 kg)     Physical Exam  Vitals and nursing note reviewed. Constitutional:       General: She is not in acute distress. Appearance: Normal appearance. She is well-developed. HENT:      Head: Normocephalic and atraumatic. Right Ear: Tympanic membrane, ear canal and external ear normal. There is no impacted cerumen. Left Ear: Tympanic membrane, ear canal and external ear normal. There is no impacted cerumen. Nose:      Comments: nasal mucosa is moderately swollen. + large amount of yellowish mucous discharge in both inferior turbinates     Mouth/Throat:      Pharynx: No oropharyngeal exudate. Eyes:      General: No scleral icterus. Pupils: Pupils are equal, round, and reactive to light. Neck:      Vascular: No JVD. Cardiovascular:      Rate and Rhythm: Normal rate and regular rhythm. Heart sounds: Normal heart sounds. No murmur heard. No friction rub. No gallop. Pulmonary:      Effort: Pulmonary effort is normal. No respiratory distress. Breath sounds: Normal breath sounds. No wheezing or rales. Abdominal:      General: Bowel sounds are normal. There is no distension. Palpations: Abdomen is soft. Tenderness: There is no abdominal tenderness. There is no right CVA tenderness or left CVA tenderness. Hernia: No hernia is present. Musculoskeletal:         General: Normal range of motion. Cervical back: Normal range of motion.       Right lower leg: No edema. Left lower leg: No edema. Comments: + p/op scar over L spine   Skin:     General: Skin is warm and dry. Capillary Refill: Capillary refill takes less than 2 seconds. Neurological:      General: No focal deficit present. Mental Status: She is alert and oriented to person, place, and time. Cranial Nerves: No cranial nerve deficit. Sensory: No sensory deficit. Psychiatric:         Mood and Affect: Mood normal.         Behavior: Behavior normal.       12 lead ECG - NSR; normal ECG     /Plan:   Mesha Zhang was seen today for pre-op exam.    Diagnoses and all orders for this visit:    Pre-op examination  -     EKG 12 Lead    Chronic bilateral low back pain without sciatica    Fibromyalgia    Migraine without status migrainosus, not intractable, unspecified migraine type    Chronic rhinitis    Continue current medications. The patient will be given Allegra 180 mg daily and Nasacort 2 sprays in each nostril daily for treatment of chronic rhinitis      Pre-Operative Risk assessment using 2014 ACC/AHA guidelines     Emergent procedure No  Active Cardiac Condition No (decompensated HF, Arrhythmia, MI <3 weeks, severe valve disease)  Risk Level of Procedure Intermediate Risk (intraperitoneal, intrathoracic, HENT, orthopedic, or carotid endarterectomy, etc.)  Revised Cardiac Risk Index Risk factors: None  Measurement of Exercise Tolerance before Surgery >4 Yes    According to the 2014 ACC/AHA pre-operative risk assessment guidelines Roberto Carlos Border is a low risk for major cardiac complications during a intermediate risk procedure and may continue as planned. Specific medication recommendations are listed below. Medications recommended to continue should be taken with a sip of water even when NPO. Further recommendations from consultants: None    1. Preoperative workup as follows: per neurosurgery  2. Change in medicationregimen before surgery: none  3.  Prophylaxis for cardiac events with perioperative beta-blockers:  Not indicated  4.  Deep vein thrombosis prophylaxis:  Per neurosurgery; encouraged early ambulation    I will fax this form to Dr Lubna Cruz [facility]) at [phone]    Wendy Javed MD, M.D.   8/16/2021, 2:53 PM

## 2021-09-07 DIAGNOSIS — M79.7 FIBROMYALGIA: ICD-10-CM

## 2021-09-08 RX ORDER — PREGABALIN 150 MG/1
CAPSULE ORAL
Qty: 120 CAPSULE | Refills: 0 | Status: SHIPPED | OUTPATIENT
Start: 2021-09-08 | End: 2021-10-08

## 2021-10-25 ENCOUNTER — HOSPITAL ENCOUNTER (OUTPATIENT)
Dept: CT IMAGING | Age: 41
Discharge: HOME | End: 2021-10-25
Payer: COMMERCIAL

## 2021-10-25 DIAGNOSIS — M43.16 SPONDYLOLISTHESIS OF LUMBAR REGION: ICD-10-CM

## 2021-10-25 DIAGNOSIS — M47.816 LUMBAR SPONDYLOSIS: ICD-10-CM

## 2021-10-25 PROCEDURE — 72131 CT LUMBAR SPINE W/O DYE: CPT

## 2022-04-09 ENCOUNTER — APPOINTMENT (OUTPATIENT)
Dept: GENERAL RADIOLOGY | Age: 42
End: 2022-04-09
Payer: COMMERCIAL

## 2022-04-09 ENCOUNTER — HOSPITAL ENCOUNTER (EMERGENCY)
Age: 42
Discharge: HOME | End: 2022-04-09
Attending: EMERGENCY MEDICINE
Payer: COMMERCIAL

## 2022-04-09 VITALS
HEIGHT: 65 IN | OXYGEN SATURATION: 98 % | TEMPERATURE: 98.2 F | WEIGHT: 187.2 LBS | HEART RATE: 80 BPM | DIASTOLIC BLOOD PRESSURE: 71 MMHG | SYSTOLIC BLOOD PRESSURE: 115 MMHG | BODY MASS INDEX: 31.19 KG/M2 | RESPIRATION RATE: 18 BRPM

## 2022-04-09 DIAGNOSIS — S09.90XA CLOSED HEAD INJURY, INITIAL ENCOUNTER: ICD-10-CM

## 2022-04-09 DIAGNOSIS — S53.401A SPRAIN OF RIGHT UPPER ARM, INITIAL ENCOUNTER: Primary | ICD-10-CM

## 2022-04-09 PROCEDURE — 6370000000 HC RX 637 (ALT 250 FOR IP): Performed by: EMERGENCY MEDICINE

## 2022-04-09 PROCEDURE — 73060 X-RAY EXAM OF HUMERUS: CPT

## 2022-04-09 PROCEDURE — 99284 EMERGENCY DEPT VISIT MOD MDM: CPT

## 2022-04-09 PROCEDURE — 73030 X-RAY EXAM OF SHOULDER: CPT

## 2022-04-09 PROCEDURE — 73090 X-RAY EXAM OF FOREARM: CPT

## 2022-04-09 RX ORDER — PREGABALIN 300 MG/1
CAPSULE ORAL
COMMUNITY
Start: 2022-04-04

## 2022-04-09 RX ORDER — OXYCODONE HYDROCHLORIDE 5 MG/1
5 TABLET ORAL ONCE
Status: COMPLETED | OUTPATIENT
Start: 2022-04-09 | End: 2022-04-09

## 2022-04-09 RX ORDER — ACETAMINOPHEN 325 MG/1
650 TABLET ORAL ONCE
Status: DISCONTINUED | OUTPATIENT
Start: 2022-04-09 | End: 2022-04-09 | Stop reason: HOSPADM

## 2022-04-09 RX ADMIN — OXYCODONE HYDROCHLORIDE 5 MG: 5 TABLET ORAL at 02:35

## 2022-04-09 ASSESSMENT — ENCOUNTER SYMPTOMS
NAUSEA: 0
VOMITING: 0
ABDOMINAL PAIN: 0
SHORTNESS OF BREATH: 0
WHEEZING: 0
COUGH: 0
DIARRHEA: 0
EYE PAIN: 0

## 2022-04-09 ASSESSMENT — PAIN DESCRIPTION - LOCATION: LOCATION: ARM

## 2022-04-09 ASSESSMENT — PAIN SCALES - GENERAL
PAINLEVEL_OUTOF10: 9
PAINLEVEL_OUTOF10: 9

## 2022-04-09 ASSESSMENT — PAIN DESCRIPTION - PAIN TYPE: TYPE: ACUTE PAIN

## 2022-04-09 ASSESSMENT — PAIN DESCRIPTION - DESCRIPTORS: DESCRIPTORS: THROBBING

## 2022-04-09 ASSESSMENT — PAIN DESCRIPTION - ORIENTATION: ORIENTATION: RIGHT

## 2022-04-09 NOTE — ED NOTES
Pt discharged to home, alert and oriented. Denies any questions about discharge instructions. Will follow up as directed. encouraged to return for any worsening symptoms.         Verito Bustamante RN  04/09/22 2026

## 2022-04-09 NOTE — ED PROVIDER NOTES
[address] [facility]          ATTENDING PHYSICIAN NOTE       Date of evaluation: 4/9/2022    Chief Complaint     Fall and Arm Injury      History of Present Illness     Pavan Gloria is a 39 y.o. female who presents with chief complaint of fall and arm injury. Patient states that she was going down the steps assisting her father when her left leg went numb and she lost her footing. Amish Antoine down several steps mostly injuring her right arm. Complains mostly of pain in her right shoulder, right humerus and right forearm. Does think she struck the right side of her head but does not have loss of consciousness. No nausea or vomiting. No visual disturbance. Patient stated she is being worked up for a lumbar disc protrusion. Continues to have some numbness isolated to the left leg. No saddle anesthesia. No bowel or bladder incontinence. No abdominal pain, nausea or vomiting. No chest pain or difficulty breathing. No fever or chills. Review of Systems     Review of Systems   Constitutional: Negative for chills and fever. HENT: Negative for congestion. Eyes: Negative for pain. Respiratory: Negative for cough, shortness of breath and wheezing. Cardiovascular: Negative for chest pain and leg swelling. Gastrointestinal: Negative for abdominal pain, diarrhea, nausea and vomiting. Genitourinary: Negative for dysuria. Musculoskeletal: Positive for arthralgias. Skin: Negative for rash. Neurological: Negative for weakness and headaches. All other systems reviewed and are negative. Past Medical, Surgical, Family, and Social History         Diagnosis Date    Anxiety     Bipolar affective disorder ([facility] [address] 3/14/2018    CHF (congestive heart failure) ([facility] [address]     pt states preg induced.     Chronic back pain     DDD (degenerative disc disease)     DDD (degenerative disc disease), cervical     DJD (degenerative joint disease), multiple sites     Fibromyalgia     GERD (gastroesophageal reflux disease)     Headache     Insomnia     Osteoarthritis     Tendinitis          Procedure Laterality Date    CARPAL TUNNEL RELEASE Bilateral      SECTION      CHOLECYSTECTOMY      ELBOW SURGERY Left     cubital tunnel release    SPINE SURGERY      TONSILLECTOMY       Her family history includes Alcohol Abuse in her mother; Arthritis in her maternal grandmother and mother; Cancer in her father; Diabetes in her father; Heart Disease in her maternal grandmother, maternal uncle, and mother; Heart Failure in her father; High Blood Pressure in her mother; Hypertension in her father; Othel Naldo in her mother; Liver Disease in her brother; Wyaconda Janetheze in her paternal grandfather and paternal grandmother; Sleep Apnea in her mother. She reports that she quit smoking about 15 months ago. Her smoking use included cigarettes. She started smoking about 28 years ago. She has a 6.75 pack-year smoking history. She has never used smokeless tobacco. She reports that she does not drink alcohol and does not use drugs. Medications     Previous Medications    CYCLOBENZAPRINE (FLEXERIL) 10 MG TABLET    TAKE 1 TABLET BY MOUTH THREE TIMES DAILY AS NEEDED FOR MUSCLE SPASMS    LYRICA 225 MG CAPSULE    TAKE 1 CAPSULE BY MOUTH TWICE DAILY    MELATONIN 1 MG TABLET    Take 1 tablet by mouth nightly as needed for Sleep    PREGABALIN (LYRICA) 150 MG CAPSULE    TAKE TWO CAPSULES BY MOUTH TWICE A DAY    PREGABALIN (LYRICA) 300 MG CAPSULE        VITAMIN D (ERGOCALCIFEROL) 1.25 MG (22742 UT) CAPS CAPSULE    Take 1 capsule by mouth once a week       Allergies     She is allergic to oxytocin, diclofenac, nickel, aspirin, ibuprofen, nsaids, phenergan [promethazine hcl], promethazine, and tylenol [acetaminophen].     Physical Exam     ED Triage Vitals [22 0200]   Enc Vitals Group      BP (!) 124/113      Pulse 85      Resp 18      Temp 98.2 °F (36.8 °C)      Temp Source Oral      SpO2 99 %      Weight 187 lb 3.2 oz (84.9 kg)      Height 5' 5\" (1.651 m)      Head Circumference       Peak Flow       Pain Score       Pain Loc       Pain Edu? Excl. in [address]? Physical Exam  Constitutional:       General: She is not in acute distress. Appearance: She is well-developed. HENT:      Head: Normocephalic. Comments: Small right parietal cephalohematoma  Eyes:      Pupils: Pupils are equal, round, and reactive to light. Neck:      Vascular: No JVD. Cardiovascular:      Rate and Rhythm: Normal rate and regular rhythm. Heart sounds: Normal heart sounds. No murmur heard. No friction rub. No gallop. Pulmonary:      Effort: Pulmonary effort is normal. No respiratory distress. Breath sounds: Normal breath sounds. No wheezing or rales. Abdominal:      General: There is no distension. Palpations: Abdomen is soft. Tenderness: There is no abdominal tenderness. Musculoskeletal:         General: Normal range of motion. Cervical back: Normal range of motion. No tenderness. Comments: There is tenderness palpation of the right shoulder, middle right humerus and right proximal forearm with no bony deformity. I am able to flex and extend the right shoulder and somewhat AB duct it though she has significant discomfort. There is no deformity. Skin:     Findings: No rash. Neurological:      Mental Status: She is alert and oriented to person, place, and time. Deep Tendon Reflexes: Reflexes normal.      Comments: Patient has 2+ patellar and Achilles tendon reflexes bilaterally. She exhibits symmetric strength of the bilateral lower extremities with no focal weakness. Sensation is intact to light touch diffusely. Diagnostic Results     RADIOLOGY:  XR SHOULDER RIGHT (MIN 2 VIEWS)   Final Result     Normal right shoulder x-rays. XR RADIUS ULNA RIGHT (2 VIEWS)   Final Result     Normal right forearm x-rays.           XR HUMERUS RIGHT (MIN 2 VIEWS)   Final Result     Normal right humerus x-rays. LABS:   No results found for this visit on 04/09/22. RECENT VITALS:  BP: (!) 124/113, Temp: 98.2 °F (36.8 °C), Pulse: 85, Resp: 18, SpO2: 99 %     Procedures         ED Course     Nursing Notes, Past Medical Hx, Past Surgical Hx, Social Hx, Allergies, and Family Hx were reviewed. The patient was given the following medications:  Orders Placed This Encounter   Medications    oxyCODONE (ROXICODONE) immediate release tablet 5 mg    acetaminophen (TYLENOL) tablet 650 mg       CONSULTS:  None    MEDICAL DECISION MAKING / ASSESSMENT / Cam Thai is a 39 y.o. female who presents primarily with right arm pain after mechanical fall. Did have feeling of numbness in her left leg prior to the fall but has a normal neurologic examination here and I have nothing that would suggest acute spinal cord compression. May have a radiculopathy. Plain film showed no evidence of fracture or dislocation of the right upper extremity. Did strike her head and has a small cephalhematoma but is a GCS of 15 and there is no indication for a noncontrasted head CT at this time as my suspicion for intracranial injury is exceedingly low. Patient is given oxycodone and Tylenol for pain. Encouraged to continue using Tylenol or over-the-counter pain medications at home. Return precautions were discussed and patient discharged home in good condition. Clinical Impression     1. Sprain of right upper arm, initial encounter    2.  Closed head injury, initial encounter        Disposition     PATIENT REFERRED TO:  [facility] Emergency Department  [address] [facility]          DISCHARGE MEDICATIONS:  New Prescriptions    No medications on file       DISPOSITION Decision To Discharge 04/09/2022 03:19:39 AM       Selina Banks MD  04/09/22 4057

## 2022-04-13 ENCOUNTER — HOSPITAL ENCOUNTER (OUTPATIENT)
Dept: MRI IMAGING | Age: 42
Discharge: HOME | End: 2022-04-13
Payer: COMMERCIAL

## 2022-04-13 DIAGNOSIS — M53.2X6 SPINAL INSTABILITY, LUMBAR: ICD-10-CM

## 2022-04-13 DIAGNOSIS — M43.16 SPONDYLOLISTHESIS OF LUMBAR REGION: ICD-10-CM

## 2022-04-13 DIAGNOSIS — M47.816 LUMBAR SPONDYLOSIS: ICD-10-CM

## 2022-04-13 PROCEDURE — 72148 MRI LUMBAR SPINE W/O DYE: CPT

## 2022-12-01 ENCOUNTER — OFFICE VISIT (OUTPATIENT)
Dept: INTERNAL MEDICINE CLINIC | Age: 42
End: 2022-12-01
Payer: COMMERCIAL

## 2022-12-01 VITALS
TEMPERATURE: 97.1 F | OXYGEN SATURATION: 96 % | DIASTOLIC BLOOD PRESSURE: 80 MMHG | HEART RATE: 99 BPM | BODY MASS INDEX: 28.56 KG/M2 | SYSTOLIC BLOOD PRESSURE: 128 MMHG | WEIGHT: 171.6 LBS

## 2022-12-01 DIAGNOSIS — Z83.79 FAMILY HISTORY OF LIVER DISEASE: ICD-10-CM

## 2022-12-01 DIAGNOSIS — Z13.220 LIPID SCREENING: ICD-10-CM

## 2022-12-01 DIAGNOSIS — F31.31 BIPOLAR AFFECTIVE DISORDER, CURRENTLY DEPRESSED, MILD (HCC): ICD-10-CM

## 2022-12-01 DIAGNOSIS — R79.89 ABNORMAL TSH: ICD-10-CM

## 2022-12-01 DIAGNOSIS — M79.7 FIBROMYALGIA: Primary | ICD-10-CM

## 2022-12-01 DIAGNOSIS — Z13.1 DIABETES MELLITUS SCREENING: ICD-10-CM

## 2022-12-01 DIAGNOSIS — Z12.31 ENCOUNTER FOR SCREENING MAMMOGRAM FOR MALIGNANT NEOPLASM OF BREAST: ICD-10-CM

## 2022-12-01 PROCEDURE — G8484 FLU IMMUNIZE NO ADMIN: HCPCS | Performed by: INTERNAL MEDICINE

## 2022-12-01 PROCEDURE — G8427 DOCREV CUR MEDS BY ELIG CLIN: HCPCS | Performed by: INTERNAL MEDICINE

## 2022-12-01 PROCEDURE — G8419 CALC BMI OUT NRM PARAM NOF/U: HCPCS | Performed by: INTERNAL MEDICINE

## 2022-12-01 PROCEDURE — 1036F TOBACCO NON-USER: CPT | Performed by: INTERNAL MEDICINE

## 2022-12-01 PROCEDURE — 99214 OFFICE O/P EST MOD 30 MIN: CPT | Performed by: INTERNAL MEDICINE

## 2022-12-01 RX ORDER — PREGABALIN 300 MG/1
300 CAPSULE ORAL 2 TIMES DAILY
Qty: 60 CAPSULE | Refills: 0 | Status: SHIPPED | OUTPATIENT
Start: 2022-12-01 | End: 2022-12-31

## 2022-12-01 SDOH — ECONOMIC STABILITY: FOOD INSECURITY: WITHIN THE PAST 12 MONTHS, YOU WORRIED THAT YOUR FOOD WOULD RUN OUT BEFORE YOU GOT MONEY TO BUY MORE.: NEVER TRUE

## 2022-12-01 SDOH — ECONOMIC STABILITY: FOOD INSECURITY: WITHIN THE PAST 12 MONTHS, THE FOOD YOU BOUGHT JUST DIDN'T LAST AND YOU DIDN'T HAVE MONEY TO GET MORE.: NEVER TRUE

## 2022-12-01 ASSESSMENT — PATIENT HEALTH QUESTIONNAIRE - PHQ9
7. TROUBLE CONCENTRATING ON THINGS, SUCH AS READING THE NEWSPAPER OR WATCHING TELEVISION: 0
5. POOR APPETITE OR OVEREATING: 0
1. LITTLE INTEREST OR PLEASURE IN DOING THINGS: 3
9. THOUGHTS THAT YOU WOULD BE BETTER OFF DEAD, OR OF HURTING YOURSELF: 0
SUM OF ALL RESPONSES TO PHQ QUESTIONS 1-9: 10
SUM OF ALL RESPONSES TO PHQ QUESTIONS 1-9: 10
8. MOVING OR SPEAKING SO SLOWLY THAT OTHER PEOPLE COULD HAVE NOTICED. OR THE OPPOSITE, BEING SO FIGETY OR RESTLESS THAT YOU HAVE BEEN MOVING AROUND A LOT MORE THAN USUAL: 0
3. TROUBLE FALLING OR STAYING ASLEEP: 3
SUM OF ALL RESPONSES TO PHQ QUESTIONS 1-9: 10
2. FEELING DOWN, DEPRESSED OR HOPELESS: 1
SUM OF ALL RESPONSES TO PHQ QUESTIONS 1-9: 10
SUM OF ALL RESPONSES TO PHQ9 QUESTIONS 1 & 2: 4
10. IF YOU CHECKED OFF ANY PROBLEMS, HOW DIFFICULT HAVE THESE PROBLEMS MADE IT FOR YOU TO DO YOUR WORK, TAKE CARE OF THINGS AT HOME, OR GET ALONG WITH OTHER PEOPLE: 1
6. FEELING BAD ABOUT YOURSELF - OR THAT YOU ARE A FAILURE OR HAVE LET YOURSELF OR YOUR FAMILY DOWN: 0
4. FEELING TIRED OR HAVING LITTLE ENERGY: 3

## 2022-12-01 ASSESSMENT — ANXIETY QUESTIONNAIRES
2. NOT BEING ABLE TO STOP OR CONTROL WORRYING: 1
5. BEING SO RESTLESS THAT IT IS HARD TO SIT STILL: 0
3. WORRYING TOO MUCH ABOUT DIFFERENT THINGS: 1
6. BECOMING EASILY ANNOYED OR IRRITABLE: 2
1. FEELING NERVOUS, ANXIOUS, OR ON EDGE: 2
GAD7 TOTAL SCORE: 10
7. FEELING AFRAID AS IF SOMETHING AWFUL MIGHT HAPPEN: 1
4. TROUBLE RELAXING: 3

## 2022-12-01 ASSESSMENT — ENCOUNTER SYMPTOMS
BACK PAIN: 1
SHORTNESS OF BREATH: 0

## 2022-12-01 ASSESSMENT — SOCIAL DETERMINANTS OF HEALTH (SDOH): HOW HARD IS IT FOR YOU TO PAY FOR THE VERY BASICS LIKE FOOD, HOUSING, MEDICAL CARE, AND HEATING?: SOMEWHAT HARD

## 2022-12-01 NOTE — ASSESSMENT & PLAN NOTE
Carries diagnosis of bipolar since her early 25s, but reportedly last psychiatrist recently thought she has PTSD  -Was on lithium, Seroquel, Prozac, Cymbalta, Zoloft, Effexor, Lexapro, Wellbutrin, Celexa, Klonopin in the past.   -given complex psych history and failing multiple first-line treatments, I do believe she will benefit from specialized psych care and asked her to establish with a psychiatrist.  She will reach out to her insurance to find who works within [employer].

## 2022-12-01 NOTE — ASSESSMENT & PLAN NOTE
-here with uncontrolled pain  -was [facility] for [facility] no shows and is out of meds for 10 days  -missed appt as she was caring for her sick father.  many life stressors (daughter is pregnant  and is24 y)  -will refer to dr Junaid Orr, will refill lyrica until she can establish with rheum   -completed exercise program  -Cymbalta tried in the past, intolerant of gabapentin

## 2022-12-01 NOTE — PROGRESS NOTES
[address] [facility] Internal Medicine  Follow up visit   2022    Bernadette Ansari (:  1980) is a 43 y.o. female, here for evaluation of the following medical concerns:    Chief Complaint   Patient presents with    Medication Refill        ASSESSMENT/ PLAN:  Fibromyalgia  -here with uncontrolled pain  -was dismissed from [facility] office for 3 no shows and is out of meds for 10 days  -missed appt as she was caring for her sick father. many life stressors (daughter is pregnant  and is24 y)  -will refer to dr Stephen Hannon, will refill lyrica until she can establish with rheum   -completed exercise program  -Cymbalta tried in the past, intolerant of gabapentin      Bipolar affective disorder ([facility] [address]  Carries diagnosis of bipolar since her early 25s, but reportedly last psychiatrist recently thought she has PTSD  -Was on lithium, Seroquel, Prozac, Cymbalta, Zoloft, Effexor, Lexapro, Wellbutrin, Celexa, Klonopin in the past.   -given complex psych history and failing multiple first-line treatments, I do believe she will benefit from specialized psych care and asked her to establish with a psychiatrist.  She will reach out to her insurance to find who works within network. Abnormal TSH  -Noted TSH 0.1 with normal FT4, T3 in 2018 which was not repeated since.  -Start with repeat TSH    Family history of liver disease  Reports significant family history of liver disease (?  Fatty liver)  -Check CMP      Orders Placed This Encounter   Procedures    LUCIANA DIGITAL SCREEN W OR WO CAD BILATERAL    Lipid Panel    Hemoglobin A1C    CBC with Auto Differential    Comprehensive Metabolic Panel    TSH with Reflex to FT4    Divya Javed MD, Rheumatology, [facility]     Orders Placed This Encounter   Medications    pregabalin (LYRICA) 300 MG capsule     Sig: Take 1 capsule by mouth 2 times daily for 30 days.      Dispense:  60 capsule     Refill:  0      Medications Discontinued During This Encounter   Medication Reason pregabalin (LYRICA) 150 MG capsule DOSE ADJUSTMENT    LYRICA 225 MG capsule DOSE ADJUSTMENT    pregabalin (LYRICA) 300 MG capsule REORDER        No follow-ups on file. HPI  Was dismissed from [facility] clinic due to missing appointments but reports she missed those due to caring for her sick father  She is on Lyrica 300 mg bid, but had been out for 10 days. Feels tired without it, sore, hard to move. Cymbalta didn't help, gabapentin made her nauseous   For depression tried- Zoloft, lexapro, klonipin, prozac, effexor, wellbutrin, celexa  Strong fhx of fatty liver       HISTORIES   Current Outpatient Medications on File Prior to Visit   Medication Sig Dispense Refill    melatonin 1 MG tablet Take 1 tablet by mouth nightly as needed for Sleep 30 tablet 1    cyclobenzaprine (FLEXERIL) 10 MG tablet TAKE 1 TABLET BY MOUTH THREE TIMES DAILY AS NEEDED FOR MUSCLE SPASMS 30 tablet 0    vitamin D (ERGOCALCIFEROL) 1.25 MG (01212 UT) CAPS capsule Take 1 capsule by mouth once a week (Patient not taking: Reported on 12/1/2022) 12 capsule 1     No current facility-administered medications on file prior to visit. Allergies   Allergen Reactions    Oxytocin Shortness Of Breath and Other (See Comments)     congestion heart failure   Caused heart failure  CHF    Diclofenac     Nickel Swelling     In earrings- caused ear to swell    Aspirin Nausea And Vomiting    Ibuprofen Itching    Nsaids Nausea And Vomiting    Phenergan [Promethazine Hcl] Nausea And Vomiting    Promethazine Nausea And Vomiting    Tylenol [Acetaminophen] Nausea And Vomiting     Past Medical History:   Diagnosis Date    Anxiety     Bipolar affective disorder ([facility] [address] 3/14/2018    CHF (congestive heart failure) ([facility] [address]     pt states preg induced.     Chronic back pain     DDD (degenerative disc disease)     DDD (degenerative disc disease), cervical     DJD (degenerative joint disease), multiple sites     Fibromyalgia     GERD (gastroesophageal reflux disease) Headache     Insomnia     Osteoarthritis     Tendinitis      Patient Active Problem List   Diagnosis    Insomnia    Chronic back pain    History of tobacco use    Allergic rhinitis    Migraine    Bipolar affective disorder (HCC)    Obstructive sleep apnea syndrome    Fibromyalgia    Vitamin D deficiency    Abnormal TSH    Family history of liver disease     Past Surgical History:   Procedure Laterality Date    CARPAL TUNNEL RELEASE Bilateral      SECTION      CHOLECYSTECTOMY      ELBOW SURGERY Left     cubital tunnel release    SPINE SURGERY      TONSILLECTOMY       Social History     Socioeconomic History    Marital status:      Spouse name: Not on file    Number of children: Not on file    Years of education: Not on file    Highest education level: Not on file   Occupational History    Not on file   Tobacco Use    Smoking status: Former     Packs/day: 0.25     Years: 27.00     Pack years: 6.75     Types: Cigarettes     Start date: 1994     Quit date: 2021     Years since quittin.9    Smokeless tobacco: Never   Vaping Use    Vaping Use: Never used   Substance and Sexual Activity    Alcohol use: No    Drug use: No    Sexual activity: Yes     Partners: Male   Other Topics Concern    Not on file   Social History Narrative    Not on file     Social Determinants of Health     Financial Resource Strain: Medium Risk    Difficulty of Paying Living Expenses: Somewhat hard   Food Insecurity: No Food Insecurity    Worried About Running Out of Food in the Last Year: Never true    Ran Out of Food in the Last Year: Never true   Transportation Needs: Not on file   Physical Activity: Not on file   Stress: Not on file   Social Connections: Not on file   Intimate Partner Violence: Not on file   Housing Stability: Not on file      Family History   Problem Relation Age of Onset    High Blood Pressure Mother     Heart Disease Mother         CHF    Arthritis Mother     Alcohol Abuse Mother     Sleep Apnea Mother     Liver Cancer Mother         alcohol     Heart Disease Maternal Grandmother     Arthritis Maternal Grandmother     Cancer Father     Diabetes Father     Hypertension Father     Heart Failure Father     Lung Cancer Paternal Grandmother     Lung Cancer Paternal Grandfather     Heart Disease Maternal Uncle     Liver Disease Brother         fattu liver       ROS  Review of Systems   Respiratory:  Negative for shortness of breath. Cardiovascular:  Negative for chest pain. Musculoskeletal:  Positive for arthralgias, back pain, myalgias and neck pain. PE  Vitals:    12/01/22 1438   BP: 128/80   Pulse: 99   Temp: 97.1 °F (36.2 °C)   SpO2: 96%   Weight: 171 lb 9.6 oz (77.8 kg)     Estimated body mass index is 28.56 kg/m² as calculated from the following:    Height as of 4/9/22: 5' 5\" (1.651 m). Weight as of this encounter: 171 lb 9.6 oz (77.8 kg). Physical Exam  Vitals reviewed. Constitutional:       General: She is not in acute distress. Appearance: Normal appearance. She is not ill-appearing, toxic-appearing or diaphoretic. HENT:      Head: Normocephalic and atraumatic. Eyes:      Conjunctiva/sclera: Conjunctivae normal.      Pupils: Pupils are equal, round, and reactive to light. Cardiovascular:      Rate and Rhythm: Normal rate and regular rhythm. Heart sounds: Normal heart sounds. Pulmonary:      Effort: Pulmonary effort is normal. No respiratory distress. Breath sounds: Normal breath sounds. Musculoskeletal:      Cervical back: Normal range of motion and neck supple. Right lower leg: No edema. Left lower leg: No edema. Skin:     General: Skin is warm and dry. Neurological:      Mental Status: She is alert and oriented to person, place, and time. Nani Kwong MD    This dictation was generated by voice recognition computer software.   Although all attempts are made to edit the dictation for accuracy, there may be errors in the transcription that are not intended.

## 2022-12-21 ENCOUNTER — TELEPHONE (OUTPATIENT)
Dept: INTERNAL MEDICINE CLINIC | Age: 42
End: 2022-12-21

## 2022-12-21 DIAGNOSIS — Z13.220 LIPID SCREENING: ICD-10-CM

## 2022-12-21 DIAGNOSIS — Z13.1 DIABETES MELLITUS SCREENING: ICD-10-CM

## 2022-12-21 DIAGNOSIS — R79.89 ABNORMAL TSH: ICD-10-CM

## 2022-12-21 DIAGNOSIS — M79.7 FIBROMYALGIA: Primary | ICD-10-CM

## 2022-12-21 DIAGNOSIS — M79.7 FIBROMYALGIA: ICD-10-CM

## 2022-12-21 LAB
A/G RATIO: 1.7 (ref 1.1–2.2)
ALBUMIN SERPL-MCNC: 3.9 G/DL (ref 3.4–5)
ALP BLD-CCNC: 89 U/L (ref 40–129)
ALT SERPL-CCNC: 8 U/L (ref 10–40)
ANION GAP SERPL CALCULATED.3IONS-SCNC: 11 MMOL/L (ref 3–16)
AST SERPL-CCNC: 10 U/L (ref 15–37)
BASOPHILS ABSOLUTE: 0.1 K/UL (ref 0–0.2)
BASOPHILS RELATIVE PERCENT: 0.7 %
BILIRUB SERPL-MCNC: 0.5 MG/DL (ref 0–1)
BUN BLDV-MCNC: 7 MG/DL (ref 7–20)
CALCIUM SERPL-MCNC: 8.8 MG/DL (ref 8.3–10.6)
CHLORIDE BLD-SCNC: 104 MMOL/L (ref 99–110)
CHOLESTEROL, TOTAL: 142 MG/DL (ref 0–199)
CO2: 23 MMOL/L (ref 21–32)
CREAT SERPL-MCNC: 0.7 MG/DL (ref 0.6–1.1)
EOSINOPHILS ABSOLUTE: 0.5 K/UL (ref 0–0.6)
EOSINOPHILS RELATIVE PERCENT: 5.7 %
GFR SERPL CREATININE-BSD FRML MDRD: >60 ML/MIN/{1.73_M2}
GLUCOSE BLD-MCNC: 142 MG/DL (ref 70–99)
HCT VFR BLD CALC: 40 % (ref 36–48)
HDLC SERPL-MCNC: 47 MG/DL (ref 40–60)
HEMOGLOBIN: 13 G/DL (ref 12–16)
LDL CHOLESTEROL CALCULATED: 77 MG/DL
LYMPHOCYTES ABSOLUTE: 2.3 K/UL (ref 1–5.1)
LYMPHOCYTES RELATIVE PERCENT: 24.2 %
MCH RBC QN AUTO: 28.9 PG (ref 26–34)
MCHC RBC AUTO-ENTMCNC: 32.7 G/DL (ref 31–36)
MCV RBC AUTO: 88.4 FL (ref 80–100)
MONOCYTES ABSOLUTE: 0.6 K/UL (ref 0–1.3)
MONOCYTES RELATIVE PERCENT: 6.6 %
NEUTROPHILS ABSOLUTE: 6 K/UL (ref 1.7–7.7)
NEUTROPHILS RELATIVE PERCENT: 62.8 %
PDW BLD-RTO: 14.5 % (ref 12.4–15.4)
PLATELET # BLD: 266 K/UL (ref 135–450)
PMV BLD AUTO: 9.7 FL (ref 5–10.5)
POTASSIUM SERPL-SCNC: 3.5 MMOL/L (ref 3.5–5.1)
RBC # BLD: 4.52 M/UL (ref 4–5.2)
SODIUM BLD-SCNC: 138 MMOL/L (ref 136–145)
TOTAL PROTEIN: 6.2 G/DL (ref 6.4–8.2)
TRIGL SERPL-MCNC: 90 MG/DL (ref 0–150)
TSH REFLEX FT4: 1.17 UIU/ML (ref 0.27–4.2)
VLDLC SERPL CALC-MCNC: 18 MG/DL
WBC # BLD: 9.5 K/UL (ref 4–11)

## 2022-12-21 NOTE — TELEPHONE ENCOUNTER
Lets refer her to the rheumatologist with [facility], Dr. Jayce Cobos to see her as planned and we can evaluate memory when I see her, please add to next visit reason note MoCA testing

## 2022-12-21 NOTE — TELEPHONE ENCOUNTER
Patient came in the office, wants Dr. Whitney Aaron to know she cannot get into rheumatology until the end of March. She had blood work done today. Will get the mammogram done today also. She has an appointment to see Dr. Whitney Aaron on 01/23/23. She forgot to mention that her memory is getting worse. She has had five concussions. Would Dr. Whitney Aaron want to see her before 01/23/23? Please call and advise, okay to leave message (she does not use [employer]).

## 2022-12-22 LAB
ESTIMATED AVERAGE GLUCOSE: 99.7 MG/DL
HBA1C MFR BLD: 5.1 %

## 2022-12-27 DIAGNOSIS — M79.7 FIBROMYALGIA: ICD-10-CM

## 2022-12-27 RX ORDER — PREGABALIN 300 MG/1
CAPSULE ORAL
Qty: 60 CAPSULE | Refills: 0 | Status: SHIPPED | OUTPATIENT
Start: 2022-12-28 | End: 2023-01-27

## 2022-12-27 NOTE — TELEPHONE ENCOUNTER
Orders Placed This Encounter   Medications    pregabalin (LYRICA) 300 MG capsule     Sig: TAKE ONE CAPSULE BY MOUTH TWICE A DAY     Dispense:  60 capsule     Refill:  0         Controlled Substance Monitoring:    Acute and Chronic Pain Monitoring:   RX Monitoring 12/27/2022   Attestation -   Acute Pain Prescriptions -   Periodic Controlled Substance Monitoring No signs of potential drug abuse or diversion identified.    Chronic Pain > 80 MEDD -

## 2023-01-11 ENCOUNTER — TELEPHONE (OUTPATIENT)
Dept: INTERNAL MEDICINE CLINIC | Age: 43
End: 2023-01-11

## 2023-01-11 DIAGNOSIS — N64.4 BREAST PAIN, LEFT: Primary | ICD-10-CM

## 2023-01-11 DIAGNOSIS — Z12.31 SCREENING MAMMOGRAM FOR BREAST CANCER: ICD-10-CM

## 2023-01-11 NOTE — TELEPHONE ENCOUNTER
MD Cristo   [facility] [address], [address]   Phone: [phone]                Dr. Ramez Santana does an order for 3D mammogram need to be placed?

## 2023-01-11 NOTE — TELEPHONE ENCOUNTER
Central Scheduling called stating a new order for Diagnostic Mammogram needs to be  ordered instead.  Pt complains of pain on outside of left breast.

## 2023-01-11 NOTE — TELEPHONE ENCOUNTER
----- Message from Js Syed sent at 1/11/2023  2:43 PM EST -----  Subject: Referral Request    Reason for referral request? Pt states the Rheumatologist at [address] [place] [address] [address]   she was referred to no longer accepts her insurance. Pt would like the   name and number of the original referral Dr Sj Asher had referred her to Pt   would also like to know if she needs Dr Sj Asher to add 3d to the the   mammograms' that was put in for her due to her having big breast.  Provider patient wants to be referred to(if known):     Provider Phone Number(if known):     Additional Information for Provider?   ---------------------------------------------------------------------------  --------------  [email or web address]    [phone]; OK to leave message on voicemail  ---------------------------------------------------------------------------  --------------

## 2023-01-11 NOTE — TELEPHONE ENCOUNTER
I think They would usually let us know if order needs to be changed or added, I think they asked for diagnostic mammogram order

## 2023-01-25 ENCOUNTER — TELEPHONE (OUTPATIENT)
Dept: INTERNAL MEDICINE CLINIC | Age: 43
End: 2023-01-25

## 2023-01-25 DIAGNOSIS — M79.7 FIBROMYALGIA: ICD-10-CM

## 2023-01-25 DIAGNOSIS — M79.7 FIBROMYALGIA: Primary | ICD-10-CM

## 2023-01-25 NOTE — TELEPHONE ENCOUNTER
----- Message from Sergo South sent at 1/25/2023  4:16 PM EST -----  Subject: Referral Request    Reason for referral request? Pt states the Rheumatologist at [address] [place] [address] [address]   she was referred to no longer accepts her insurance. Pt would like to be   referred to someone else within the [facility]  Provider patient wants to be referred to(if known):     Provider Phone Number(if known):     Additional Information for Provider?   ---------------------------------------------------------------------------  --------------  [address] [employer]    [phone]; OK to leave message on voicemail  ---------------------------------------------------------------------------  --------------

## 2023-01-25 NOTE — TELEPHONE ENCOUNTER
----- Message from Anastacio Tabor sent at 1/25/2023  4:14 PM EST -----  Subject: Refill Request    QUESTIONS  Name of Medication? pregabalin (LYRICA) 300 MG capsule  Patient-reported dosage and instructions? 1 tab 2x daily   How many days do you have left? 3  [facility]? [facility] [id number]  Pharmacy phone number (if available)? [phone]  ---------------------------------------------------------------------------  --------------,  Name of Medication? Other - cyclobenzaprine 10 mg  Patient-reported dosage and instructions? 1 twice a day - she takes as   needed  How many days do you have left? 0  [facility]erred Pharmacy? [facility] [id number]  Pharmacy phone number (if available)? [phone]  Additional Information for Provider? Pt states Dr Pratik Bro has not refilled   it but would like to see if she would. Pt states she has not had it   refilled in a couple of months.  ---------------------------------------------------------------------------  --------------  CALL BACK INFO  What is the best way for the office to contact you? OK to leave message on   voicemail  Preferred Call Back Phone Number? [phone]  ---------------------------------------------------------------------------  --------------  SCRIPT ANSWERS  Relationship to Patient?  Self

## 2023-01-25 NOTE — TELEPHONE ENCOUNTER
----- Message from [facility] sent at 1/25/2023  4:29 PM EST -----  Subject: Message to Provider    QUESTIONS  Information for Provider? pt returned phone call from office called office   line is busy please wait over and over  ---------------------------------------------------------------------------  --------------  [address] [employer]  [phone]; OK to leave message on voicemail,Do not leave any message,   patient will call back for answer  ---------------------------------------------------------------------------  --------------  SCRIPT ANSWERS  undefined

## 2023-01-26 RX ORDER — PREGABALIN 300 MG/1
CAPSULE ORAL
Qty: 60 CAPSULE | Refills: 1 | Status: SHIPPED | OUTPATIENT
Start: 2023-01-26 | End: 2023-03-07 | Stop reason: SDUPTHER

## 2023-01-26 NOTE — TELEPHONE ENCOUNTER
MD Cristo   [facility] [address]   Phone: [phone]            Left message on machine for patient with name and number.

## 2023-01-30 RX ORDER — CYCLOBENZAPRINE HCL 10 MG
TABLET ORAL
Qty: 30 TABLET | Refills: 0 | OUTPATIENT
Start: 2023-01-30

## 2023-02-09 ENCOUNTER — TELEPHONE (OUTPATIENT)
Dept: INTERNAL MEDICINE CLINIC | Age: 43
End: 2023-02-09

## 2023-02-09 NOTE — TELEPHONE ENCOUNTER
Patient called again, she is still waiting for a new referral to a rheumatologist.    [facility] Rheumatology office is closing.     Please call patient back!!

## 2023-02-13 NOTE — TELEPHONE ENCOUNTER
Left message on machine for patient to call back regarding Rheumatologist, were given  2 names, not sure if insurance coverage is the issue or she needs names again

## 2023-02-14 ENCOUNTER — HOSPITAL ENCOUNTER (OUTPATIENT)
Dept: ULTRASOUND IMAGING | Age: 43
Discharge: HOME | End: 2023-02-14
Payer: COMMERCIAL

## 2023-02-14 ENCOUNTER — HOSPITAL ENCOUNTER (OUTPATIENT)
Dept: MAMMOGRAPHY | Age: 43
Discharge: HOME | End: 2023-02-14
Payer: COMMERCIAL

## 2023-02-14 ENCOUNTER — TELEPHONE (OUTPATIENT)
Dept: INTERNAL MEDICINE CLINIC | Age: 43
End: 2023-02-14

## 2023-02-14 VITALS — WEIGHT: 171 LBS | BODY MASS INDEX: 28.49 KG/M2 | HEIGHT: 65 IN

## 2023-02-14 DIAGNOSIS — N64.4 BREAST PAIN: ICD-10-CM

## 2023-02-14 DIAGNOSIS — R92.8 ABNORMAL MAMMOGRAM: ICD-10-CM

## 2023-02-14 DIAGNOSIS — Z12.31 SCREENING MAMMOGRAM FOR BREAST CANCER: ICD-10-CM

## 2023-02-14 DIAGNOSIS — N64.4 BREAST PAIN, LEFT: ICD-10-CM

## 2023-02-14 PROCEDURE — G0279 TOMOSYNTHESIS, MAMMO: HCPCS

## 2023-02-14 PROCEDURE — 76642 ULTRASOUND BREAST LIMITED: CPT

## 2023-02-14 NOTE — TELEPHONE ENCOUNTER
Patient would like a referral to rheumatologist. Dr. Layo Lowery will be leaving so patient needs a referral.       Pls call and advise.

## 2023-02-14 NOTE — TELEPHONE ENCOUNTER
Patient called back, the second referral for [facility] does not accept her insurance.     Please advise

## 2023-02-15 NOTE — TELEPHONE ENCOUNTER
Left message on machine for patient to check with her insurance company to see which Rheumatologist are covered and let us know.

## 2023-03-07 ENCOUNTER — OFFICE VISIT (OUTPATIENT)
Dept: INTERNAL MEDICINE CLINIC | Age: 43
End: 2023-03-07
Payer: COMMERCIAL

## 2023-03-07 VITALS
BODY MASS INDEX: 28.66 KG/M2 | WEIGHT: 172 LBS | HEIGHT: 65 IN | SYSTOLIC BLOOD PRESSURE: 124 MMHG | DIASTOLIC BLOOD PRESSURE: 68 MMHG

## 2023-03-07 DIAGNOSIS — F31.31 BIPOLAR AFFECTIVE DISORDER, CURRENTLY DEPRESSED, MILD (HCC): ICD-10-CM

## 2023-03-07 DIAGNOSIS — M79.7 FIBROMYALGIA: ICD-10-CM

## 2023-03-07 PROCEDURE — G8484 FLU IMMUNIZE NO ADMIN: HCPCS | Performed by: INTERNAL MEDICINE

## 2023-03-07 PROCEDURE — 4004F PT TOBACCO SCREEN RCVD TLK: CPT | Performed by: INTERNAL MEDICINE

## 2023-03-07 PROCEDURE — G8419 CALC BMI OUT NRM PARAM NOF/U: HCPCS | Performed by: INTERNAL MEDICINE

## 2023-03-07 PROCEDURE — 99214 OFFICE O/P EST MOD 30 MIN: CPT | Performed by: INTERNAL MEDICINE

## 2023-03-07 PROCEDURE — G8427 DOCREV CUR MEDS BY ELIG CLIN: HCPCS | Performed by: INTERNAL MEDICINE

## 2023-03-07 RX ORDER — PREGABALIN 300 MG/1
CAPSULE ORAL
Qty: 60 CAPSULE | Refills: 2 | Status: SHIPPED | OUTPATIENT
Start: 2023-03-23 | End: 2023-05-05

## 2023-03-07 SDOH — ECONOMIC STABILITY: HOUSING INSECURITY
IN THE LAST 12 MONTHS, WAS THERE A TIME WHEN YOU DID NOT HAVE A STEADY PLACE TO SLEEP OR SLEPT IN A SHELTER (INCLUDING NOW)?: NO

## 2023-03-07 SDOH — ECONOMIC STABILITY: INCOME INSECURITY: HOW HARD IS IT FOR YOU TO PAY FOR THE VERY BASICS LIKE FOOD, HOUSING, MEDICAL CARE, AND HEATING?: NOT HARD AT ALL

## 2023-03-07 SDOH — ECONOMIC STABILITY: FOOD INSECURITY: WITHIN THE PAST 12 MONTHS, THE FOOD YOU BOUGHT JUST DIDN'T LAST AND YOU DIDN'T HAVE MONEY TO GET MORE.: NEVER TRUE

## 2023-03-07 SDOH — ECONOMIC STABILITY: FOOD INSECURITY: WITHIN THE PAST 12 MONTHS, YOU WORRIED THAT YOUR FOOD WOULD RUN OUT BEFORE YOU GOT MONEY TO BUY MORE.: NEVER TRUE

## 2023-03-07 ASSESSMENT — PATIENT HEALTH QUESTIONNAIRE - PHQ9
SUM OF ALL RESPONSES TO PHQ QUESTIONS 1-9: 0
3. TROUBLE FALLING OR STAYING ASLEEP: 0
SUM OF ALL RESPONSES TO PHQ QUESTIONS 1-9: 0
7. TROUBLE CONCENTRATING ON THINGS, SUCH AS READING THE NEWSPAPER OR WATCHING TELEVISION: 0
SUM OF ALL RESPONSES TO PHQ QUESTIONS 1-9: 0
9. THOUGHTS THAT YOU WOULD BE BETTER OFF DEAD, OR OF HURTING YOURSELF: 0
2. FEELING DOWN, DEPRESSED OR HOPELESS: 0
SUM OF ALL RESPONSES TO PHQ QUESTIONS 1-9: 0
5. POOR APPETITE OR OVEREATING: 0
SUM OF ALL RESPONSES TO PHQ9 QUESTIONS 1 & 2: 0
8. MOVING OR SPEAKING SO SLOWLY THAT OTHER PEOPLE COULD HAVE NOTICED. OR THE OPPOSITE, BEING SO FIGETY OR RESTLESS THAT YOU HAVE BEEN MOVING AROUND A LOT MORE THAN USUAL: 0
1. LITTLE INTEREST OR PLEASURE IN DOING THINGS: 0
6. FEELING BAD ABOUT YOURSELF - OR THAT YOU ARE A FAILURE OR HAVE LET YOURSELF OR YOUR FAMILY DOWN: 0
10. IF YOU CHECKED OFF ANY PROBLEMS, HOW DIFFICULT HAVE THESE PROBLEMS MADE IT FOR YOU TO DO YOUR WORK, TAKE CARE OF THINGS AT HOME, OR GET ALONG WITH OTHER PEOPLE: 0
4. FEELING TIRED OR HAVING LITTLE ENERGY: 0

## 2023-03-07 ASSESSMENT — ENCOUNTER SYMPTOMS: SHORTNESS OF BREATH: 0

## 2023-03-07 NOTE — ASSESSMENT & PLAN NOTE
Overall pain is managed with current treatment  -was dismissed from [facility] office  With [facility] for 3 no shows, missed appt as she was caring for her sick father.  many life stressors (daughter just had a stillbirth)  -will continue lyrica until she can establish with rheum with [facility] per insurance   -completed exercise program  -Cymbalta tried in the past, intolerant of gabapentin

## 2023-03-07 NOTE — PROGRESS NOTES
[address] [facility]  Follow up visit   3/7/2023    Maryann Agarwal (:  1980) is a 43 y.o. female, here for evaluation of the following medical concerns:    Chief Complaint   Patient presents with    Chronic Pain        ASSESSMENT/ PLAN:  Fibromyalgia  Overall pain is managed with current treatment  -was dismissed from [facility] office  With [facility] for 3 no shows, missed appt as she was caring for her sick father. many life stressors (daughter just had a stillbirth)  -will continue lyrica until she can establish with rheum with [facility] per insurance   -completed exercise program  -Cymbalta tried in the past, intolerant of gabapentin      Bipolar affective disorder ([facility] [address]  Carries diagnosis of bipolar since her early 25s, but reportedly last psychiatrist recently thought she has PTSD  -Was on lithium, Seroquel, Prozac, Cymbalta, Zoloft, Effexor, Lexapro, Wellbutrin, Celexa, Klonopin in the past.   -given complex psych history and failing multiple first-line treatments, I do believe she will benefit from specialized psych care and asked her to establish with a psychiatrist.  She will reach out to her insurance to find who works within network. No orders of the defined types were placed in this encounter. Orders Placed This Encounter   Medications    pregabalin (LYRICA) 300 MG capsule     Sig: TAKE ONE CAPSULE BY MOUTH TWICE A DAY     Dispense:  60 capsule     Refill:  2      Medications Discontinued During This Encounter   Medication Reason    pregabalin (LYRICA) 300 MG capsule REORDER        No follow-ups on file. HPI  Overall doing fair, many recent stressors, her daughter who is 80years old just had a stillbirth in January. Her sick father fell. She is taking Lyrica regularly which is controlling her pain adequately. Was unable to get in with rheumatology with [facility] due to insurance coverage, Dr. Layo Lowery is not currently accepting new patients.   Per insurance can see [facility] or [facility]    HISTORIES   Current Outpatient Medications on File Prior to Visit   Medication Sig Dispense Refill    cyclobenzaprine (FLEXERIL) 10 MG tablet TAKE 1 TABLET BY MOUTH TWO TIMES DAILY AS NEEDED FOR MUSCLE SPASMS 60 tablet 0    melatonin 1 MG tablet Take 1 tablet by mouth nightly as needed for Sleep 30 tablet 1    vitamin D (ERGOCALCIFEROL) 1.25 MG (22296 UT) CAPS capsule Take 1 capsule by mouth once a week 12 capsule 1     No current facility-administered medications on file prior to visit. Allergies   Allergen Reactions    Oxytocin Shortness Of Breath and Other (See Comments)     congestion heart failure   Caused heart failure  CHF    Diclofenac     Nickel Swelling     In earrings- caused ear to swell    Aspirin Nausea And Vomiting    Ibuprofen Itching    Nsaids Nausea And Vomiting    Phenergan [Promethazine Hcl] Nausea And Vomiting    Promethazine Nausea And Vomiting    Tylenol [Acetaminophen] Nausea And Vomiting     Past Medical History:   Diagnosis Date    Anxiety     Bipolar affective disorder ([facility] [address] 3/14/2018    CHF (congestive heart failure) ([facility] [address]     pt states preg induced.     Chronic back pain     DDD (degenerative disc disease)     DDD (degenerative disc disease), cervical     DJD (degenerative joint disease), multiple sites     Fibromyalgia     GERD (gastroesophageal reflux disease)     Headache     Insomnia     Osteoarthritis     Tendinitis      Patient Active Problem List   Diagnosis    Insomnia    Chronic back pain    History of tobacco use    Allergic rhinitis    Migraine    Bipolar affective disorder ([facility] [address]    Obstructive sleep apnea syndrome    Fibromyalgia    Vitamin D deficiency    Abnormal TSH    Family history of liver disease     Past Surgical History:   Procedure Laterality Date    CARPAL TUNNEL RELEASE Bilateral      SECTION      CHOLECYSTECTOMY      ELBOW SURGERY Left     cubital tunnel release    SPINE SURGERY      TONSILLECTOMY       Social History     Socioeconomic History Marital status:      Spouse name: Not on file    Number of children: Not on file    Years of education: Not on file    Highest education level: Not on file   Occupational History    Not on file   Tobacco Use    Smoking status: Every Day     Packs/day: 0.25     Years: 27.00     Pack years: 6.75     Types: Cigarettes     Start date: 1994     Last attempt to quit: 2021     Years since quittin.1    Smokeless tobacco: Never   Vaping Use    Vaping Use: Never used   Substance and Sexual Activity    Alcohol use: No    Drug use: No    Sexual activity: Yes     Partners: Male   Other Topics Concern    Not on file   Social History Narrative    Not on file     Social Determinants of Health     Financial Resource Strain: Low Risk     Difficulty of Paying Living Expenses: Not hard at all   Food Insecurity: No Food Insecurity    Worried About Running Out of Food in the Last Year: Never true    [address] in the Last Year: Never true   Transportation Needs: Unknown    Lack of Transportation (Medical): Not on file    Lack of Transportation (Non-Medical):  No   Physical Activity: Not on file   Stress: Not on file   Social Connections: Not on file   Intimate Partner Violence: Not on file   Housing Stability: Unknown    Unable to Pay for Housing in the Last Year: Not on file    Number of Places Lived in the Last Year: Not on file    Unstable Housing in the Last Year: No      Family History   Problem Relation Age of Onset    High Blood Pressure Mother     Heart Disease Mother         CHF    Arthritis Mother     Alcohol Abuse Mother     Sleep Apnea Mother     Liver Cancer Mother         alcohol     Heart Disease Maternal Grandmother     Arthritis Maternal Grandmother     Cancer Father     Diabetes Father     Hypertension Father     Heart Failure Father     Lung Cancer Paternal Grandmother     Lung Cancer Paternal Grandfather     Heart Disease Maternal Uncle     Liver Disease Brother         fattu liver ROS  Review of Systems   Respiratory:  Negative for shortness of breath. Cardiovascular:  Negative for chest pain and leg swelling. PE  Vitals:    03/07/23 1238   BP: 124/68   Site: Right Upper Arm   Weight: 172 lb (78 kg)   Height: 5' 5\" (1.651 m)     Estimated body mass index is 28.62 kg/m² as calculated from the following:    Height as of this encounter: 5' 5\" (1.651 m). Weight as of this encounter: 172 lb (78 kg). Physical Exam  Vitals reviewed. Constitutional:       General: She is not in acute distress. Appearance: Normal appearance. She is not ill-appearing, toxic-appearing or diaphoretic. HENT:      Head: Normocephalic and atraumatic. Eyes:      Conjunctiva/sclera: Conjunctivae normal.      Pupils: Pupils are equal, round, and reactive to light. Cardiovascular:      Rate and Rhythm: Normal rate and regular rhythm. Heart sounds: Normal heart sounds. Pulmonary:      Effort: Pulmonary effort is normal. No respiratory distress. Breath sounds: Normal breath sounds. Musculoskeletal:      Cervical back: Normal range of motion and neck supple. Right lower leg: No edema. Left lower leg: No edema. Skin:     General: Skin is warm and dry. Neurological:      Mental Status: She is alert and oriented to person, place, and time. James Hoffmann MD    This dictation was generated by voice recognition computer software. Although all attempts are made to edit the dictation for accuracy, there may be errors in the transcription that are not intended.

## 2023-03-07 NOTE — ASSESSMENT & PLAN NOTE
Carries diagnosis of bipolar since her early 25s, but reportedly last psychiatrist recently thought she has PTSD  -Was on lithium, Seroquel, Prozac, Cymbalta, Zoloft, Effexor, Lexapro, Wellbutrin, Celexa, Klonopin in the past.   -given complex psych history and failing multiple first-line treatments, I do believe she will benefit from specialized psych care and asked her to establish with a psychiatrist.  She will reach out to her insurance to find who works within [employer].

## 2023-05-20 ENCOUNTER — HOSPITAL ENCOUNTER (EMERGENCY)
Age: 43
Discharge: HOME | End: 2023-05-20
Attending: EMERGENCY MEDICINE
Payer: COMMERCIAL

## 2023-05-20 ENCOUNTER — APPOINTMENT (OUTPATIENT)
Dept: MRI IMAGING | Age: 43
End: 2023-05-20
Payer: COMMERCIAL

## 2023-05-20 ENCOUNTER — APPOINTMENT (OUTPATIENT)
Dept: CT IMAGING | Age: 43
End: 2023-05-20
Payer: COMMERCIAL

## 2023-05-20 VITALS
SYSTOLIC BLOOD PRESSURE: 148 MMHG | OXYGEN SATURATION: 99 % | DIASTOLIC BLOOD PRESSURE: 114 MMHG | HEART RATE: 80 BPM | RESPIRATION RATE: 24 BRPM | WEIGHT: 168 LBS | BODY MASS INDEX: 27.99 KG/M2 | TEMPERATURE: 98 F | HEIGHT: 65 IN

## 2023-05-20 DIAGNOSIS — M54.31 SCIATICA OF RIGHT SIDE: ICD-10-CM

## 2023-05-20 DIAGNOSIS — M54.50 ACUTE EXACERBATION OF CHRONIC LOW BACK PAIN: Primary | ICD-10-CM

## 2023-05-20 DIAGNOSIS — M54.10 RADICULOPATHY, UNSPECIFIED SPINAL REGION: ICD-10-CM

## 2023-05-20 DIAGNOSIS — G89.29 ACUTE EXACERBATION OF CHRONIC LOW BACK PAIN: Primary | ICD-10-CM

## 2023-05-20 PROCEDURE — 96372 THER/PROPH/DIAG INJ SC/IM: CPT

## 2023-05-20 PROCEDURE — 6360000002 HC RX W HCPCS: Performed by: EMERGENCY MEDICINE

## 2023-05-20 PROCEDURE — 6370000000 HC RX 637 (ALT 250 FOR IP): Performed by: EMERGENCY MEDICINE

## 2023-05-20 PROCEDURE — 72131 CT LUMBAR SPINE W/O DYE: CPT

## 2023-05-20 PROCEDURE — 99284 EMERGENCY DEPT VISIT MOD MDM: CPT

## 2023-05-20 PROCEDURE — 72148 MRI LUMBAR SPINE W/O DYE: CPT

## 2023-05-20 RX ORDER — OXYCODONE HYDROCHLORIDE AND ACETAMINOPHEN 5; 325 MG/1; MG/1
1 TABLET ORAL EVERY 8 HOURS PRN
Qty: 12 TABLET | Refills: 0 | Status: SHIPPED | OUTPATIENT
Start: 2023-05-20 | End: 2023-05-20 | Stop reason: SDUPTHER

## 2023-05-20 RX ORDER — OXYCODONE HYDROCHLORIDE 5 MG/1
5 TABLET ORAL ONCE
Status: COMPLETED | OUTPATIENT
Start: 2023-05-20 | End: 2023-05-20

## 2023-05-20 RX ORDER — PROMETHAZINE HYDROCHLORIDE 25 MG/ML
25 INJECTION, SOLUTION INTRAMUSCULAR; INTRAVENOUS ONCE
Status: DISCONTINUED | OUTPATIENT
Start: 2023-05-20 | End: 2023-05-20 | Stop reason: HOSPADM

## 2023-05-20 RX ORDER — OXYCODONE HYDROCHLORIDE AND ACETAMINOPHEN 5; 325 MG/1; MG/1
1 TABLET ORAL EVERY 8 HOURS PRN
Qty: 12 TABLET | Refills: 0 | Status: SHIPPED | OUTPATIENT
Start: 2023-05-20 | End: 2023-05-20 | Stop reason: ALTCHOICE

## 2023-05-20 RX ORDER — METHYLPREDNISOLONE 4 MG/1
TABLET ORAL
Qty: 1 KIT | Refills: 0 | Status: SHIPPED | OUTPATIENT
Start: 2023-05-20 | End: 2023-05-26

## 2023-05-20 RX ORDER — OXYCODONE HYDROCHLORIDE 5 MG/1
5 TABLET ORAL EVERY 8 HOURS PRN
Qty: 6 TABLET | Refills: 0 | Status: SHIPPED | OUTPATIENT
Start: 2023-05-20 | End: 2023-05-20 | Stop reason: SDUPTHER

## 2023-05-20 RX ORDER — OXYCODONE HYDROCHLORIDE 5 MG/1
5 TABLET ORAL EVERY 8 HOURS PRN
Qty: 6 TABLET | Refills: 0 | Status: SHIPPED | OUTPATIENT
Start: 2023-05-20 | End: 2023-05-22

## 2023-05-20 RX ADMIN — HYDROMORPHONE HYDROCHLORIDE 1 MG: 1 INJECTION, SOLUTION INTRAMUSCULAR; INTRAVENOUS; SUBCUTANEOUS at 08:12

## 2023-05-20 RX ADMIN — OXYCODONE 5 MG: 5 TABLET ORAL at 12:06

## 2023-05-20 ASSESSMENT — PAIN DESCRIPTION - LOCATION: LOCATION: BACK

## 2023-05-20 ASSESSMENT — PAIN - FUNCTIONAL ASSESSMENT: PAIN_FUNCTIONAL_ASSESSMENT: 0-10

## 2023-05-20 ASSESSMENT — PAIN DESCRIPTION - ORIENTATION: ORIENTATION: LOWER

## 2023-05-20 ASSESSMENT — PAIN SCALES - GENERAL
PAINLEVEL_OUTOF10: 9
PAINLEVEL_OUTOF10: 9

## 2023-05-20 ASSESSMENT — PAIN DESCRIPTION - DESCRIPTORS: DESCRIPTORS: STABBING

## 2023-05-20 NOTE — ED PROVIDER NOTES
NEUROSURGERY  IP CONSULT TO NEUROSURGERY    Review of Systems     Review of Systems   Constitutional:  Positive for activity change. Negative for fever. Neurological:  Positive for weakness and numbness. All other systems reviewed and are negative. Past Medical, Surgical, Family, and Social History     She has a past medical history of Anxiety, Bipolar affective disorder ([facility] [address] CHF (congestive heart failure) ([facility] [address] Chronic back pain, DDD (degenerative disc disease), DDD (degenerative disc disease), cervical, DJD (degenerative joint disease), multiple sites, Fibromyalgia, GERD (gastroesophageal reflux disease), Headache, Insomnia, Osteoarthritis, and Tendinitis. She has a past surgical history that includes Spine surgery;  section; Cholecystectomy; Tonsillectomy; Carpal tunnel release (Bilateral); and Elbow surgery (Left). Her family history includes Alcohol Abuse in her mother; Arthritis in her maternal grandmother and mother; Cancer in her father; Diabetes in her father; Heart Disease in her maternal grandmother, maternal uncle, and mother; Heart Failure in her father; High Blood Pressure in her mother; Hypertension in her father; Ivory Babinski in her mother; Liver Disease in her brother; Lacy Jagdeep in her paternal grandfather and paternal grandmother; Sleep Apnea in her mother. She reports that she has been smoking cigarettes. She started smoking about 29 years ago. She has a 6.75 pack-year smoking history. She has never used smokeless tobacco. She reports that she does not drink alcohol and does not use drugs.     Medications     Previous Medications    CYCLOBENZAPRINE (FLEXERIL) 10 MG TABLET    TAKE 1 TABLET BY MOUTH TWO TIMES DAILY AS NEEDED FOR MUSCLE SPASMS    MELATONIN 1 MG TABLET    Take 1 tablet by mouth nightly as needed for Sleep    PREGABALIN (LYRICA) 300 MG CAPSULE    TAKE ONE CAPSULE BY MOUTH TWICE A DAY    VITAMIN D (ERGOCALCIFEROL) 1.25 MG (14901 UT) CAPS CAPSULE    Take 1 capsule

## 2023-06-15 DIAGNOSIS — M79.7 FIBROMYALGIA: ICD-10-CM

## 2023-06-15 RX ORDER — PREGABALIN 300 MG/1
CAPSULE ORAL
Qty: 60 CAPSULE | Refills: 2 | OUTPATIENT
Start: 2023-06-15 | End: 2023-07-28

## 2023-06-15 RX ORDER — CYCLOBENZAPRINE HCL 10 MG
TABLET ORAL
Qty: 60 TABLET | Refills: 0 | Status: SHIPPED | OUTPATIENT
Start: 2023-06-15

## 2023-06-28 ENCOUNTER — TELEMEDICINE (OUTPATIENT)
Dept: INTERNAL MEDICINE CLINIC | Age: 43
End: 2023-06-28
Payer: COMMERCIAL

## 2023-06-28 DIAGNOSIS — M79.7 FIBROMYALGIA: ICD-10-CM

## 2023-06-28 DIAGNOSIS — R79.89 ABNORMAL TSH: ICD-10-CM

## 2023-06-28 PROCEDURE — G8427 DOCREV CUR MEDS BY ELIG CLIN: HCPCS | Performed by: INTERNAL MEDICINE

## 2023-06-28 PROCEDURE — 99214 OFFICE O/P EST MOD 30 MIN: CPT | Performed by: INTERNAL MEDICINE

## 2023-06-28 RX ORDER — TIZANIDINE 2 MG/1
2 TABLET ORAL 2 TIMES DAILY PRN
Qty: 60 TABLET | Refills: 2 | Status: SHIPPED | OUTPATIENT
Start: 2023-06-28

## 2023-06-28 ASSESSMENT — PATIENT HEALTH QUESTIONNAIRE - PHQ9
1. LITTLE INTEREST OR PLEASURE IN DOING THINGS: 0
SUM OF ALL RESPONSES TO PHQ9 QUESTIONS 1 & 2: 0
SUM OF ALL RESPONSES TO PHQ QUESTIONS 1-9: 0
4. FEELING TIRED OR HAVING LITTLE ENERGY: 0
SUM OF ALL RESPONSES TO PHQ QUESTIONS 1-9: 0
9. THOUGHTS THAT YOU WOULD BE BETTER OFF DEAD, OR OF HURTING YOURSELF: 0
3. TROUBLE FALLING OR STAYING ASLEEP: 0
SUM OF ALL RESPONSES TO PHQ QUESTIONS 1-9: 0
6. FEELING BAD ABOUT YOURSELF - OR THAT YOU ARE A FAILURE OR HAVE LET YOURSELF OR YOUR FAMILY DOWN: 0
7. TROUBLE CONCENTRATING ON THINGS, SUCH AS READING THE NEWSPAPER OR WATCHING TELEVISION: 0
10. IF YOU CHECKED OFF ANY PROBLEMS, HOW DIFFICULT HAVE THESE PROBLEMS MADE IT FOR YOU TO DO YOUR WORK, TAKE CARE OF THINGS AT HOME, OR GET ALONG WITH OTHER PEOPLE: 0
5. POOR APPETITE OR OVEREATING: 0
SUM OF ALL RESPONSES TO PHQ QUESTIONS 1-9: 0
8. MOVING OR SPEAKING SO SLOWLY THAT OTHER PEOPLE COULD HAVE NOTICED. OR THE OPPOSITE, BEING SO FIGETY OR RESTLESS THAT YOU HAVE BEEN MOVING AROUND A LOT MORE THAN USUAL: 0
2. FEELING DOWN, DEPRESSED OR HOPELESS: 0

## 2023-06-28 ASSESSMENT — ENCOUNTER SYMPTOMS: BACK PAIN: 1

## 2023-07-10 DIAGNOSIS — M79.7 FIBROMYALGIA: ICD-10-CM

## 2023-07-11 RX ORDER — PREGABALIN 300 MG/1
CAPSULE ORAL
Qty: 60 CAPSULE | Refills: 2 | Status: SHIPPED | OUTPATIENT
Start: 2023-07-11 | End: 2023-10-04

## 2023-10-05 DIAGNOSIS — M79.7 FIBROMYALGIA: ICD-10-CM

## 2023-10-05 RX ORDER — PREGABALIN 300 MG/1
CAPSULE ORAL
Qty: 180 CAPSULE | Refills: 1 | Status: SHIPPED | OUTPATIENT
Start: 2023-10-05 | End: 2023-12-29

## 2023-10-05 NOTE — TELEPHONE ENCOUNTER
----- Message from Audrey Black sent at 10/2/2023  3:23 PM EDT -----  Subject: Refill Request    QUESTIONS  Name of Medication? pregabalin (LYRICA) 300 MG capsule  Patient-reported dosage and instructions? 300 mg capsule  How many days do you have left? 7  Preferred Pharmacy? [facility] [id number]  Pharmacy phone number (if available)? [phone]  ---------------------------------------------------------------------------  --------------  Chente Mcdonald INFO  What is the best way for the office to contact you? OK to leave message on   voicemail  Preferred Call Back Phone Number? [phone]  ---------------------------------------------------------------------------  --------------  SCRIPT ANSWERS  Relationship to Patient?  Self

## 2023-10-09 NOTE — PATIENT INSTRUCTIONS
11.4  © 5878-9526 [employer]. Care instructions adapted under license by [facility] ([facility]). If you have questions about a medical condition or this instruction, always ask your healthcare professional. [address] any warranty or liability for your use of this information. This document is complete and the patient is ready for discharge.

## 2023-12-01 ENCOUNTER — TELEPHONE (OUTPATIENT)
Dept: INTERNAL MEDICINE CLINIC | Age: 43
End: 2023-12-01
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
## 2024-03-12 DIAGNOSIS — M79.7 FIBROMYALGIA: ICD-10-CM

## 2024-03-12 NOTE — TELEPHONE ENCOUNTER
----- Message from Princess Yusuf sent at 3/12/2024  3:17 PM EDT -----  Subject: Message to Provider    QUESTIONS  Information for Provider? Patient will run out of meds before her next   appointment. Will the doctor still refill these to get her to her   appointment? Would she be able to come in sooner? Please advise.  ---------------------------------------------------------------------------  --------------  CALL BACK INFO  [phone]; OK to leave message on voicemail  ---------------------------------------------------------------------------  --------------  SCRIPT ANSWERS  Relationship to Patient? Self

## 2024-03-13 NOTE — TELEPHONE ENCOUNTER
Pt is calling back to let Anny know it is her medication listed below:          pregabalin (LYRICA) 300 MG capsule [[id number]]  ENDED

## 2024-03-14 RX ORDER — PREGABALIN 300 MG/1
CAPSULE ORAL
Qty: 180 CAPSULE | Refills: 0 | OUTPATIENT
Start: 2024-03-14 | End: 2024-06-06

## 2024-03-14 NOTE — TELEPHONE ENCOUNTER
Needs to keep regular follow-ups for controlled substance prescription.  She no showed to her last appointment 12/23.  Needs an appointment for further refills

## 2024-04-30 ENCOUNTER — OFFICE VISIT (OUTPATIENT)
Dept: INTERNAL MEDICINE CLINIC | Age: 44
End: 2024-04-30
Payer: COMMERCIAL

## 2024-04-30 VITALS
SYSTOLIC BLOOD PRESSURE: 110 MMHG | DIASTOLIC BLOOD PRESSURE: 74 MMHG | HEIGHT: 64 IN | OXYGEN SATURATION: 98 % | WEIGHT: 149 LBS | BODY MASS INDEX: 25.44 KG/M2 | HEART RATE: 104 BPM | TEMPERATURE: 97.5 F

## 2024-04-30 DIAGNOSIS — F31.31 BIPOLAR AFFECTIVE DISORDER, CURRENTLY DEPRESSED, MILD (HCC): ICD-10-CM

## 2024-04-30 DIAGNOSIS — R79.89 ABNORMAL TSH: Primary | ICD-10-CM

## 2024-04-30 DIAGNOSIS — M79.7 FIBROMYALGIA: ICD-10-CM

## 2024-04-30 PROCEDURE — 99214 OFFICE O/P EST MOD 30 MIN: CPT | Performed by: INTERNAL MEDICINE

## 2024-04-30 RX ORDER — PREGABALIN 300 MG/1
CAPSULE ORAL
Qty: 180 CAPSULE | Refills: 1 | Status: SHIPPED | OUTPATIENT
Start: 2024-04-30 | End: 2024-11-24

## 2024-04-30 RX ORDER — TIZANIDINE 2 MG/1
2 TABLET ORAL 2 TIMES DAILY PRN
Qty: 60 TABLET | Refills: 0 | Status: SHIPPED | OUTPATIENT
Start: 2024-04-30

## 2024-04-30 SDOH — ECONOMIC STABILITY: INCOME INSECURITY: HOW HARD IS IT FOR YOU TO PAY FOR THE VERY BASICS LIKE FOOD, HOUSING, MEDICAL CARE, AND HEATING?: VERY HARD

## 2024-04-30 SDOH — ECONOMIC STABILITY: FOOD INSECURITY: WITHIN THE PAST 12 MONTHS, YOU WORRIED THAT YOUR FOOD WOULD RUN OUT BEFORE YOU GOT MONEY TO BUY MORE.: NEVER TRUE

## 2024-04-30 SDOH — ECONOMIC STABILITY: FOOD INSECURITY: WITHIN THE PAST 12 MONTHS, THE FOOD YOU BOUGHT JUST DIDN'T LAST AND YOU DIDN'T HAVE MONEY TO GET MORE.: NEVER TRUE

## 2024-04-30 ASSESSMENT — PATIENT HEALTH QUESTIONNAIRE - PHQ9
4. FEELING TIRED OR HAVING LITTLE ENERGY: NEARLY EVERY DAY
6. FEELING BAD ABOUT YOURSELF - OR THAT YOU ARE A FAILURE OR HAVE LET YOURSELF OR YOUR FAMILY DOWN: SEVERAL DAYS
10. IF YOU CHECKED OFF ANY PROBLEMS, HOW DIFFICULT HAVE THESE PROBLEMS MADE IT FOR YOU TO DO YOUR WORK, TAKE CARE OF THINGS AT HOME, OR GET ALONG WITH OTHER PEOPLE: VERY DIFFICULT
SUM OF ALL RESPONSES TO PHQ9 QUESTIONS 1 & 2: 3
SUM OF ALL RESPONSES TO PHQ QUESTIONS 1-9: 13
7. TROUBLE CONCENTRATING ON THINGS, SUCH AS READING THE NEWSPAPER OR WATCHING TELEVISION: NOT AT ALL
5. POOR APPETITE OR OVEREATING: NEARLY EVERY DAY
9. THOUGHTS THAT YOU WOULD BE BETTER OFF DEAD, OR OF HURTING YOURSELF: NOT AT ALL
1. LITTLE INTEREST OR PLEASURE IN DOING THINGS: MORE THAN HALF THE DAYS
2. FEELING DOWN, DEPRESSED OR HOPELESS: SEVERAL DAYS
SUM OF ALL RESPONSES TO PHQ QUESTIONS 1-9: 13
8. MOVING OR SPEAKING SO SLOWLY THAT OTHER PEOPLE COULD HAVE NOTICED. OR THE OPPOSITE, BEING SO FIGETY OR RESTLESS THAT YOU HAVE BEEN MOVING AROUND A LOT MORE THAN USUAL: NOT AT ALL
3. TROUBLE FALLING OR STAYING ASLEEP: NEARLY EVERY DAY

## 2024-04-30 ASSESSMENT — ENCOUNTER SYMPTOMS: BACK PAIN: 1

## 2024-04-30 NOTE — PATIENT INSTRUCTIONS
[facility] Financial Resources*  (Call 211 if need more resources.)     [facility] Financial Assistance  What they offer: Financial assistance programs that are designed to assist you in finding resources that may help pay your hospital bill. Please click on the links below to learn more about the financial assistance programs available within our regions.  Phone Number: [phone]  How to apply for the [facility] Financial Assistance Program:       Option 1: To apply for financial assistance, a patient (or their family or other provider) should fill out the Financial Assistance Application. Copies of the Financial Assistance Application and the FAP may be obtained for free by calling the [facility] Customer Service department at [phone]   Option 2: The Financial Assistance Application and policy may be obtained for free by downloading a copy from the [place] ([employer]) website:  https://www.[employer]/patient-resources/financial-assistance  [facility] [employer] Partnership Program  What they offer: If financial strain is hindering your ability to meet health care needs, the [employer] Partnership Program may be able to help. It provides support to patients facing complex health issues and social barriers. These services are provided at no cost.   Eligible Patients  Adults who are at or below 200% poverty level  Uninsured, underinsured, or those at risk of losing health insurance  You may be eligible to receive:  One-on-one support enrolling in Medicaid, Marketplace health care coverage, financial assistance, and other benefit programs  Short-term or long-term case management to support and help connect you to appropriate health care services and community-based services.   Assistance with Primary care and prescription costs   referrals as a partnering agency with [employer] to support those overcoming homelessness and living in extreme poverty.   How to Contact:   Phone: Main line

## 2024-04-30 NOTE — ASSESSMENT & PLAN NOTE
Carries diagnosis of bipolar since her early 20s, but reportedly last psychiatrist recently thought she has PTSD?  -Was on lithium, Seroquel, Prozac, Cymbalta, Zoloft, Effexor, Lexapro, Wellbutrin, Celexa, Klonopin in the past.   -given complex psych history and failing multiple first-line treatments, I do believe she will benefit from specialized psych care and asked her to establish with a psychiatrist.  She will reach out to her insurance to find who works within network.

## 2024-04-30 NOTE — PROGRESS NOTES
CHF    Arthritis Mother     Alcohol Abuse Mother     Sleep Apnea Mother     Liver Cancer Mother         alcohol     Heart Disease Maternal Grandmother     Arthritis Maternal Grandmother     Cancer Father     Diabetes Father     Hypertension Father     Heart Failure Father     Lung Cancer Paternal Grandmother     Lung Cancer Paternal Grandfather     Heart Disease Maternal Uncle     Liver Disease Brother         fattu liver       ROS  Review of Systems   Musculoskeletal:  Positive for arthralgias and back pain. Negative for gait problem and joint swelling.       PE  Vitals:    04/30/24 1034   BP: 110/74   Site: Left Upper Arm   Position: Sitting   Cuff Size: Medium Adult   Pulse: (!) 104   Temp: 97.5 °F (36.4 °C)   TempSrc: Temporal   SpO2: 98%   Weight: 67.6 kg (149 lb)   Height: 1.626 m (5' 4\")     Estimated body mass index is 25.58 kg/m² as calculated from the following:    Height as of this encounter: 1.626 m (5' 4\").    Weight as of this encounter: 67.6 kg (149 lb).    Physical Exam  Constitutional:       General: She is not in acute distress.     Appearance: Normal appearance. She is not ill-appearing, toxic-appearing or diaphoretic.   HENT:      Head: Normocephalic and atraumatic.      Nose: Nose normal.   Eyes:      Extraocular Movements: Extraocular movements intact.      Conjunctiva/sclera: Conjunctivae normal.      Pupils: Pupils are equal, round, and reactive to light.   Pulmonary:      Effort: Pulmonary effort is normal. No respiratory distress.   Musculoskeletal:      Cervical back: Normal range of motion and neck supple.   Skin:     Coloration: Skin is not jaundiced or pale.   Neurological:      Mental Status: She is alert and oriented to person, place, and time.   Psychiatric:         Mood and Affect: Mood normal.         Behavior: Behavior normal.           Leo Sheikh MD    This dictation was generated by voice recognition computer software.  Although all attempts are made to edit the dictation

## 2024-04-30 NOTE — ASSESSMENT & PLAN NOTE
Overall pain was managed with current treatment but ran out 1 month ago and pain uncontrolled since  -will continue lyrica until she can establish with rheum per insurance (will have new insurance starting July), will resume now.   -was dismissed from [facility] office  With [facility] for 3 no shows, missed appt as she was caring for her sick father. She no showed for me lately twice as well. Explained the importance of keeping appts and letting us know if unable to come in.   -will continue tizanidine PRN   -completed exercise program, but looking into getting in another program   -Cymbalta tried in the past, intolerant of gabapentin

## 2024-05-02 ENCOUNTER — TELEPHONE (OUTPATIENT)
Dept: INTERNAL MEDICINE CLINIC | Age: 44
End: 2024-05-02

## 2024-05-03 NOTE — TELEPHONE ENCOUNTER
Pharmacy advised generic is covered for $67, will advise pt. If a brand is wanted pharmacy will call back.

## 2024-05-03 NOTE — TELEPHONE ENCOUNTER
Submitted PA for Pregabalin 300MG capsules  [facility] Key: [id number]  STATUS: This medication or product is on your plan's list of covered drugs. Prior authorization is not required at this time. If your pharmacy has questions regarding the processing of your prescription, please have them call the [employer] pharmacy help desk at [phone].      [facility] sent this PA for Brand Lyrica.  I don't see that the Brand was prescribed, so I did the PA for generic Pregabalin that is covered without a PA.    If the patient is taking Brand Lyrica, please let me know.  If patient is taking Pregabalin, please let the pharmacy know.  Thank you!

## 2024-07-11 ENCOUNTER — OFFICE VISIT (OUTPATIENT)
Dept: INTERNAL MEDICINE CLINIC | Age: 44
End: 2024-07-11
Payer: COMMERCIAL

## 2024-07-11 VITALS
WEIGHT: 150 LBS | SYSTOLIC BLOOD PRESSURE: 120 MMHG | DIASTOLIC BLOOD PRESSURE: 64 MMHG | HEIGHT: 64 IN | BODY MASS INDEX: 25.61 KG/M2

## 2024-07-11 DIAGNOSIS — R79.89 ABNORMAL TSH: ICD-10-CM

## 2024-07-11 DIAGNOSIS — M79.7 FIBROMYALGIA: ICD-10-CM

## 2024-07-11 DIAGNOSIS — F31.31 BIPOLAR AFFECTIVE DISORDER, CURRENTLY DEPRESSED, MILD (HCC): Primary | ICD-10-CM

## 2024-07-11 PROCEDURE — 99214 OFFICE O/P EST MOD 30 MIN: CPT | Performed by: INTERNAL MEDICINE

## 2024-07-11 RX ORDER — PREGABALIN 300 MG/1
300 CAPSULE ORAL 2 TIMES DAILY
Qty: 180 CAPSULE | Refills: 0 | Status: SHIPPED | OUTPATIENT
Start: 2024-08-05 | End: 2024-11-03

## 2024-07-11 ASSESSMENT — ENCOUNTER SYMPTOMS: SHORTNESS OF BREATH: 0

## 2024-07-11 NOTE — PROGRESS NOTES
[facility]  Follow up visit   2024    Catherine Dowd (:  1980) is a 43 y.o. female, here for evaluation of the following medical concerns:    Chief Complaint   Patient presents with    Insomnia        ASSESSMENT/ PLAN:    Bipolar affective disorder (HCC)  Carries diagnosis of bipolar since her early 20s, but reportedly last psychiatrist recently thought she has PTSD?  -Was on lithium, Seroquel, Prozac, Cymbalta, Zoloft, Effexor, Lexapro, Wellbutrin, Celexa, Klonopin in the past.   -given complex psych history and failing multiple first-line treatments, I wanted her to see [facility] scheduled for next month.     Fibromyalgia  Overall pain was managed with current treatment   -will continue lyrica until she can establish with rheum per insurance   -was dismissed from [facility] office with [facility] for 3 no shows, missed appt as she was caring for her sick father.   -will continue tizanidine PRN   -completed exercise program, but looking into getting in another program   -Cymbalta tried in the past, intolerant of gabapentin      Abnormal TSH  -Normalized on repeat testing (TSH 0.1 with normal FT4, T3 in 2018), repeat today pending  (last done )      No orders of the defined types were placed in this encounter.    Orders Placed This Encounter   Medications    pregabalin (LYRICA) 300 MG capsule     Sig: Take 1 capsule by mouth 2 times daily for 90 days. TAKE 1 CAPSULE BY MOUTH TWICE A DAY Max Daily Amount: 600 mg     Dispense:  180 capsule     Refill:  0      Medications Discontinued During This Encounter   Medication Reason    pregabalin (LYRICA) 300 MG capsule REORDER        No follow-ups on file.    [facility] [place]  Here for follow-up.  Overall doing well.  Pain remains well-controlled with current regimen.  He is taking tizanidine about twice a week.  Was scheduled to see psychiatry but given insurance change needed to reschedule, currently scheduled to establish next month.  Had not scheduled with

## 2024-07-11 NOTE — ASSESSMENT & PLAN NOTE
-Normalized on repeat testing (TSH 0.1 with normal FT4, T3 in 2018), repeat today pending  (last done 2022)

## 2024-07-11 NOTE — ASSESSMENT & PLAN NOTE
Carries diagnosis of bipolar since her early 20s, but reportedly last psychiatrist recently thought she has PTSD?  -Was on lithium, Seroquel, Prozac, Cymbalta, Zoloft, Effexor, Lexapro, Wellbutrin, Celexa, Klonopin in the past.   -given complex psych history and failing multiple first-line treatments, I wanted her to see [facility] scheduled for next month.

## 2024-07-11 NOTE — ASSESSMENT & PLAN NOTE
Overall pain was managed with current treatment   -will continue lyrica until she can establish with rheum per insurance   -was dismissed from [facility] office with [facility] for 3 no shows, missed appt as she was caring for her sick father.   -will continue tizanidine PRN   -completed exercise program, but looking into getting in another program   -Cymbalta tried in the past, intolerant of gabapentin

## 2024-07-24 ENCOUNTER — TELEPHONE (OUTPATIENT)
Dept: INTERNAL MEDICINE CLINIC | Age: 44
End: 2024-07-24

## 2024-07-24 DIAGNOSIS — R79.89 ABNORMAL TSH: Primary | ICD-10-CM

## 2024-07-24 NOTE — TELEPHONE ENCOUNTER
Gave pt lab results, please call and let her know when she can repeat her labs for her thyroid.    [phone]

## 2024-07-25 NOTE — TELEPHONE ENCOUNTER
Left message on machine for patient that orders have been placed and she can go at her convenice and to hold multivitamin 3 days prior to testing.

## 2024-08-08 ENCOUNTER — TELEPHONE (OUTPATIENT)
Dept: INTERNAL MEDICINE CLINIC | Age: 44
End: 2024-08-08

## 2024-08-09 NOTE — TELEPHONE ENCOUNTER
Submitted PA for Pregabalin 300MG capsules  Via [facility] Key: [id number]  STATUS: PENDING.    Follow up done daily; if no decision with in three days we will refax.  If another three days goes by with no decision will call the insurance for status.

## 2024-08-12 NOTE — TELEPHONE ENCOUNTER
APPROVAL for Pregabalin 300MG capsules 08/10/24-08/10/25; letter attached.    If this requires a response please respond to the pool ( [facility] MEDICATION PRE-AUTH).      Thank you please advise patient.

## 2024-11-11 ENCOUNTER — OFFICE VISIT (OUTPATIENT)
Dept: INTERNAL MEDICINE CLINIC | Age: 44
End: 2024-11-11

## 2024-11-11 VITALS
DIASTOLIC BLOOD PRESSURE: 80 MMHG | WEIGHT: 154 LBS | BODY MASS INDEX: 26.29 KG/M2 | SYSTOLIC BLOOD PRESSURE: 136 MMHG | OXYGEN SATURATION: 96 % | HEIGHT: 64 IN | HEART RATE: 96 BPM

## 2024-11-11 DIAGNOSIS — Z12.31 ENCOUNTER FOR SCREENING MAMMOGRAM FOR MALIGNANT NEOPLASM OF BREAST: ICD-10-CM

## 2024-11-11 DIAGNOSIS — M79.7 FIBROMYALGIA: Primary | ICD-10-CM

## 2024-11-11 DIAGNOSIS — R79.89 ABNORMAL TSH: ICD-10-CM

## 2024-11-11 DIAGNOSIS — F31.31 BIPOLAR AFFECTIVE DISORDER, CURRENTLY DEPRESSED, MILD (HCC): ICD-10-CM

## 2024-11-11 RX ORDER — PREGABALIN 300 MG/1
300 CAPSULE ORAL 2 TIMES DAILY
Qty: 180 CAPSULE | Refills: 0 | Status: SHIPPED | OUTPATIENT
Start: 2024-11-11 | End: 2025-02-09

## 2024-11-11 ASSESSMENT — ENCOUNTER SYMPTOMS
ROS SKIN COMMENTS: NO HAIR/NAIL CHANGES
COLOR CHANGE: 0
TROUBLE SWALLOWING: 0
SHORTNESS OF BREATH: 0
CONSTIPATION: 0
DIARRHEA: 0

## 2024-11-11 NOTE — ASSESSMENT & PLAN NOTE
Last testing showed low TSH again (seen in 2018 TSH 0.1), clinically no signs of hyperthyroidism but did note tachycardia 90-100s today  -Repeat TSH now, will also check CBC.  If labs show persistent hypothyroidism will need to see endocrinology

## 2024-11-11 NOTE — ASSESSMENT & PLAN NOTE
Overall pain was managed with current treatment   -will continue lyrica until she can establish with rheum per insurance   -was dismissed from [facility] office with [facility] for 3 no shows, missed appt as she was caring for her sick father.   -will continue tizanidine PRN   -completed exercise program,  -Cymbalta tried in the past, intolerant of gabapentin   -PDMP reviewed, no concerns for misuse.

## 2024-11-11 NOTE — ASSESSMENT & PLAN NOTE
Carries diagnosis of bipolar since her early 20s, but reportedly last psychiatrist recently thought she has PTSD?  -Was on lithium, Seroquel, Prozac, Cymbalta, Zoloft, Effexor, Lexapro, Wellbutrin, Celexa, Klonopin in the past.   -given complex psych history and failing multiple first-line treatments, I wanted her to see psych, was unable to get in with any clinic accepting her insurance, will send to Dr. Ley

## 2024-11-11 NOTE — PROGRESS NOTES
[facility]  Follow up visit   2024    Catherine Dowd (:  1980) is a 44 y.o. female, here for evaluation of the following medical concerns:    Chief Complaint   Patient presents with    Follow-up        ASSESSMENT/ PLAN:  Fibromyalgia  Overall pain was managed with current treatment   -will continue lyrica until she can establish with rheum per insurance   -was dismissed from [facility] office with [facility] for 3 no shows, missed appt as she was caring for her sick father.   -will continue tizanidine PRN   -completed exercise program,  -Cymbalta tried in the past, intolerant of gabapentin   -PDMP reviewed, no concerns for misuse.    Bipolar affective disorder (HCC)  Carries diagnosis of bipolar since her early 20s, but reportedly last psychiatrist recently thought she has PTSD?  -Was on lithium, Seroquel, Prozac, Cymbalta, Zoloft, Effexor, Lexapro, Wellbutrin, Celexa, Klonopin in the past.   -given complex psych history and failing multiple first-line treatments, I wanted her to see psych, was unable to get in with any clinic accepting her insurance, will send to Dr. Lye    Abnormal TSH  Last testing showed low TSH again (seen in 2018 TSH 0.1), clinically no signs of hyperthyroidism but did note tachycardia 90-100s today  -Repeat TSH now, will also check CBC.  If labs show persistent hypothyroidism will need to see endocrinology      Orders Placed This Encounter   Procedures    LUCIANA DIGITAL SCREEN W OR WO CAD BILATERAL    TSH with Reflex to FT4 ([place] Only)    CBC with Auto Differential     Orders Placed This Encounter   Medications    pregabalin (LYRICA) 300 MG capsule     Sig: Take 1 capsule by mouth 2 times daily for 90 days. TAKE 1 CAPSULE BY MOUTH TWICE A DAY Max Daily Amount: 600 mg     Dispense:  180 capsule     Refill:  0      Medications Discontinued During This Encounter   Medication Reason    pregabalin (LYRICA) 300 MG capsule REORDER        No follow-ups on

## 2025-01-02 ENCOUNTER — CLINICAL DOCUMENTATION (OUTPATIENT)
Dept: PSYCHIATRY | Age: 45
End: 2025-01-02

## 2025-01-02 NOTE — PROGRESS NOTES
Patient no call, no show for new patient appointment. Patient did not answer phone calls. Per clinic policy, patient blocked from future appointments.    Rashida Ley MD  Psychiatry

## 2025-02-10 DIAGNOSIS — R79.89 ABNORMAL TSH: ICD-10-CM

## 2025-02-11 ENCOUNTER — OFFICE VISIT (OUTPATIENT)
Dept: INTERNAL MEDICINE CLINIC | Age: 45
End: 2025-02-11
Payer: COMMERCIAL

## 2025-02-11 VITALS
SYSTOLIC BLOOD PRESSURE: 108 MMHG | HEIGHT: 65 IN | OXYGEN SATURATION: 97 % | HEART RATE: 117 BPM | WEIGHT: 147.2 LBS | DIASTOLIC BLOOD PRESSURE: 70 MMHG | BODY MASS INDEX: 24.53 KG/M2 | TEMPERATURE: 96.8 F

## 2025-02-11 DIAGNOSIS — F31.31 BIPOLAR AFFECTIVE DISORDER, CURRENTLY DEPRESSED, MILD (HCC): ICD-10-CM

## 2025-02-11 DIAGNOSIS — E05.90 HYPERTHYROIDISM: Primary | ICD-10-CM

## 2025-02-11 DIAGNOSIS — M79.7 FIBROMYALGIA: ICD-10-CM

## 2025-02-11 PROBLEM — R79.89 ABNORMAL TSH: Status: RESOLVED | Noted: 2022-12-01 | Resolved: 2025-02-11

## 2025-02-11 LAB
BASOPHILS # BLD: 0.1 K/UL (ref 0–0.2)
BASOPHILS NFR BLD: 1 %
DEPRECATED RDW RBC AUTO: 13.9 % (ref 12.4–15.4)
EOSINOPHIL # BLD: 0.1 K/UL (ref 0–0.6)
EOSINOPHIL NFR BLD: 0.7 %
HCT VFR BLD AUTO: 45.5 % (ref 36–48)
HGB BLD-MCNC: 15.1 G/DL (ref 12–16)
LYMPHOCYTES # BLD: 2.4 K/UL (ref 1–5.1)
LYMPHOCYTES NFR BLD: 25.9 %
MCH RBC QN AUTO: 29.9 PG (ref 26–34)
MCHC RBC AUTO-ENTMCNC: 33.3 G/DL (ref 31–36)
MCV RBC AUTO: 89.7 FL (ref 80–100)
MONOCYTES # BLD: 0.7 K/UL (ref 0–1.3)
MONOCYTES NFR BLD: 7.4 %
NEUTROPHILS # BLD: 6 K/UL (ref 1.7–7.7)
NEUTROPHILS NFR BLD: 65 %
PLATELET # BLD AUTO: 322 K/UL (ref 135–450)
PMV BLD AUTO: 9.7 FL (ref 5–10.5)
RBC # BLD AUTO: 5.07 M/UL (ref 4–5.2)
T3 SERPL-MCNC: 1.21 NG/ML (ref 0.8–2)
T4 FREE SERPL-MCNC: 1.2 NG/DL (ref 0.9–1.8)
TSH SERPL DL<=0.005 MIU/L-ACNC: 0.13 UIU/ML (ref 0.27–4.2)
WBC # BLD AUTO: 9.2 K/UL (ref 4–11)

## 2025-02-11 PROCEDURE — G2211 COMPLEX E/M VISIT ADD ON: HCPCS | Performed by: INTERNAL MEDICINE

## 2025-02-11 PROCEDURE — 99214 OFFICE O/P EST MOD 30 MIN: CPT | Performed by: INTERNAL MEDICINE

## 2025-02-11 RX ORDER — PREGABALIN 300 MG/1
300 CAPSULE ORAL 2 TIMES DAILY
Qty: 180 CAPSULE | Refills: 0 | Status: SHIPPED | OUTPATIENT
Start: 2025-02-11 | End: 2025-05-12

## 2025-02-11 SDOH — ECONOMIC STABILITY: FOOD INSECURITY: WITHIN THE PAST 12 MONTHS, YOU WORRIED THAT YOUR FOOD WOULD RUN OUT BEFORE YOU GOT MONEY TO BUY MORE.: SOMETIMES TRUE

## 2025-02-11 SDOH — ECONOMIC STABILITY: FOOD INSECURITY: WITHIN THE PAST 12 MONTHS, THE FOOD YOU BOUGHT JUST DIDN'T LAST AND YOU DIDN'T HAVE MONEY TO GET MORE.: SOMETIMES TRUE

## 2025-02-11 ASSESSMENT — PATIENT HEALTH QUESTIONNAIRE - PHQ9
3. TROUBLE FALLING OR STAYING ASLEEP: NEARLY EVERY DAY
SUM OF ALL RESPONSES TO PHQ QUESTIONS 1-9: 6
SUM OF ALL RESPONSES TO PHQ QUESTIONS 1-9: 6
8. MOVING OR SPEAKING SO SLOWLY THAT OTHER PEOPLE COULD HAVE NOTICED. OR THE OPPOSITE, BEING SO FIGETY OR RESTLESS THAT YOU HAVE BEEN MOVING AROUND A LOT MORE THAN USUAL: NOT AT ALL
6. FEELING BAD ABOUT YOURSELF - OR THAT YOU ARE A FAILURE OR HAVE LET YOURSELF OR YOUR FAMILY DOWN: NOT AT ALL
SUM OF ALL RESPONSES TO PHQ9 QUESTIONS 1 & 2: 0
9. THOUGHTS THAT YOU WOULD BE BETTER OFF DEAD, OR OF HURTING YOURSELF: NOT AT ALL
7. TROUBLE CONCENTRATING ON THINGS, SUCH AS READING THE NEWSPAPER OR WATCHING TELEVISION: NOT AT ALL
5. POOR APPETITE OR OVEREATING: NOT AT ALL
4. FEELING TIRED OR HAVING LITTLE ENERGY: NEARLY EVERY DAY
SUM OF ALL RESPONSES TO PHQ QUESTIONS 1-9: 6
1. LITTLE INTEREST OR PLEASURE IN DOING THINGS: NOT AT ALL
SUM OF ALL RESPONSES TO PHQ QUESTIONS 1-9: 6
10. IF YOU CHECKED OFF ANY PROBLEMS, HOW DIFFICULT HAVE THESE PROBLEMS MADE IT FOR YOU TO DO YOUR WORK, TAKE CARE OF THINGS AT HOME, OR GET ALONG WITH OTHER PEOPLE: SOMEWHAT DIFFICULT
2. FEELING DOWN, DEPRESSED OR HOPELESS: NOT AT ALL

## 2025-02-11 ASSESSMENT — ENCOUNTER SYMPTOMS: DIARRHEA: 0

## 2025-02-11 NOTE — ASSESSMENT & PLAN NOTE
Carries diagnosis of bipolar since her early 20s, but reportedly last psychiatrist thought she has PTSD?  -Was on lithium, Seroquel, Prozac, Cymbalta, Zoloft, Effexor, Lexapro, Wellbutrin, Celexa, Klonopin in the past.   -given complex psych history and failing multiple first-line treatments, I want her to see psych, no showed to Dr. Ley, will send to mindfuly

## 2025-02-11 NOTE — ASSESSMENT & PLAN NOTE
Overall pain was managed with current treatment   -will continue lyrica 300 mg twice daily.  Continue 3 months  -was dismissed from [facility] office with [facility] for 3 no shows, missed appt as she was caring for her sick father.   -will continue tizanidine PRN, rare use   -completed exercise program  -Cymbalta tried in the past, intolerant of gabapentin   -PDMP reviewed, no concerns for misuse.

## 2025-02-11 NOTE — ASSESSMENT & PLAN NOTE
Noted low TSH at least twice, normal FT4.  Tachycardic and notes unintentional weight loss, will send to endo for eval

## 2025-02-11 NOTE — PROGRESS NOTES
[facility]  Follow up visit   2025    Catherine Dowd (:  1980) is a 44 y.o. female, here for evaluation of the following medical concerns:    Chief Complaint   Patient presents with    3 Month Follow-Up    Medication Check           Discuss Labs        ASSESSMENT/ PLAN:    Fibromyalgia  Overall pain was managed with current treatment   -will continue lyrica 300 mg twice daily.  Continue 3 months  -was dismissed from [facility] office with [facility] for 3 no shows, missed appt as she was caring for her sick father.   -will continue tizanidine PRN, rare use   -completed exercise program  -Cymbalta tried in the past, intolerant of gabapentin   -PDMP reviewed, no concerns for misuse.    Hyperthyroidism  Noted low TSH at least twice, normal FT4.  Tachycardic and notes unintentional weight loss, will send to [facility] for eval     Bipolar affective disorder (HCC)  Carries diagnosis of bipolar since her early 20s, but reportedly last psychiatrist thought she has PTSD?  -Was on lithium, Seroquel, Prozac, Cymbalta, Zoloft, Effexor, Lexapro, Wellbutrin, Celexa, Klonopin in the past.   -given complex psych history and failing multiple first-line treatments, I want her to see psych, no showed to Dr. Ley, will send to mindfuly       Orders Placed This Encounter   Procedures    Joshua Degroot MD, Endocrinology, [facility]    Forrest Cunningham DO, Psychiatry, [facility]     Orders Placed This Encounter   Medications    pregabalin (LYRICA) 300 MG capsule     Sig: Take 1 capsule by mouth 2 times daily for 90 days. Max Daily Amount: 600 mg     Dispense:  180 capsule     Refill:  0      Medications Discontinued During This Encounter   Medication Reason    pregabalin (LYRICA) 300 MG capsule REORDER        No follow-ups on file.    [facility]  For follow-up.  Overall doing okay.  Pain is well-managed with current treatment.  Denies palpitations.  No hair shedding, no chronic diarrhea.  Had lost

## 2025-02-11 NOTE — PATIENT INSTRUCTIONS
[place] [employer]   Speak to a trained professional 24/7 who can connect you to essential community services including food, clothing, transportation, housing, utilities, employment services, childcare, and baby supplies. 211 serves nationwide.  [facility] [employer] for resources in [place], [facility], [place] and [facility] in [place]; [place], [place], [place], and [facility] in [place].   [employer].org/resources for resources in [place], [place], [place], [place], [place], [place], [place], [place], [facility], [place], [place], and [facility] in [place].    [employer]   What they offer: [employer] is a nationwide network of food morales, food pantries, and meal programs.  Website: https://www.feedingamerica.org/find-your-local-foodAbrazo West Campus      National Hunger Hotline  What they offer: The hotline is a resource for individuals and families seeking information on how and where to obtain food.   Website:  https://www.hungerfreeamerica.org/en-us/[facility]-national-hunger-hotline    Hunger Hotline Phone Number: 9-700-0-HUNGYAMILETH (1-341.915.5083)  Hours of Operation: Monday - Friday 7am to 10pm   The Hunger Hotline also operates a texting service. Our number is 993-122-6203. Please note that these are automated texts and we do not reply or monitor texts.   [employer] [facility]  What they offer: $1 for $1 matching for families receiving SNAP/food stamps when spent on “healthy” food.  The match money can be used to purchase fruits and vegetables so adds more healthy food choices for SNAP beneficiaries.   Contact: https://Gifi/locations/           SNAP (formerly [employer])   What they offer: SNAP is used like cash to buy eligible food items from authorized retailers.  Apply for benefits online: https://jfs.[place].gov/ofam/foodstamps.stm     Apply for benefits by phone or in-person by visiting your local Job and Family Services. Locate your county's Job and family services by searching the directory at

## 2025-02-16 ENCOUNTER — APPOINTMENT (OUTPATIENT)
Dept: CT IMAGING | Age: 45
End: 2025-02-16
Payer: COMMERCIAL

## 2025-02-16 ENCOUNTER — HOSPITAL ENCOUNTER (EMERGENCY)
Age: 45
Discharge: HOME | End: 2025-02-16
Attending: EMERGENCY MEDICINE
Payer: COMMERCIAL

## 2025-02-16 VITALS
BODY MASS INDEX: 25.01 KG/M2 | OXYGEN SATURATION: 99 % | DIASTOLIC BLOOD PRESSURE: 66 MMHG | RESPIRATION RATE: 20 BRPM | WEIGHT: 148 LBS | TEMPERATURE: 99 F | HEART RATE: 79 BPM | SYSTOLIC BLOOD PRESSURE: 108 MMHG

## 2025-02-16 DIAGNOSIS — S02.40DA CLOSED FRACTURE OF LEFT SIDE OF MAXILLA, INITIAL ENCOUNTER (HCC): Primary | ICD-10-CM

## 2025-02-16 PROCEDURE — 70486 CT MAXILLOFACIAL W/O DYE: CPT

## 2025-02-16 PROCEDURE — 99284 EMERGENCY DEPT VISIT MOD MDM: CPT

## 2025-02-16 ASSESSMENT — PAIN DESCRIPTION - FREQUENCY: FREQUENCY: CONTINUOUS

## 2025-02-16 ASSESSMENT — PAIN - FUNCTIONAL ASSESSMENT
PAIN_FUNCTIONAL_ASSESSMENT: PREVENTS OR INTERFERES SOME ACTIVE ACTIVITIES AND ADLS
PAIN_FUNCTIONAL_ASSESSMENT: 0-10

## 2025-02-16 ASSESSMENT — PAIN SCALES - GENERAL
PAINLEVEL_OUTOF10: 10
PAINLEVEL_OUTOF10: 9

## 2025-02-16 ASSESSMENT — PAIN DESCRIPTION - PAIN TYPE: TYPE: ACUTE PAIN

## 2025-02-16 ASSESSMENT — PAIN DESCRIPTION - DESCRIPTORS
DESCRIPTORS: SORE
DESCRIPTORS: THROBBING

## 2025-02-16 ASSESSMENT — PAIN DESCRIPTION - LOCATION
LOCATION: NECK
LOCATION: HEAD

## 2025-02-16 ASSESSMENT — PAIN DESCRIPTION - ONSET: ONSET: ON-GOING
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
## 2025-05-13 ENCOUNTER — OFFICE VISIT (OUTPATIENT)
Dept: INTERNAL MEDICINE CLINIC | Age: 45
End: 2025-05-13
Payer: COMMERCIAL

## 2025-05-13 VITALS
DIASTOLIC BLOOD PRESSURE: 70 MMHG | TEMPERATURE: 96.9 F | HEART RATE: 89 BPM | SYSTOLIC BLOOD PRESSURE: 110 MMHG | BODY MASS INDEX: 26.94 KG/M2 | OXYGEN SATURATION: 98 % | HEIGHT: 64 IN | WEIGHT: 157.8 LBS

## 2025-05-13 DIAGNOSIS — Z12.11 COLON CANCER SCREENING: ICD-10-CM

## 2025-05-13 DIAGNOSIS — K21.9 GASTROESOPHAGEAL REFLUX DISEASE, UNSPECIFIED WHETHER ESOPHAGITIS PRESENT: ICD-10-CM

## 2025-05-13 DIAGNOSIS — M79.7 FIBROMYALGIA: ICD-10-CM

## 2025-05-13 DIAGNOSIS — Z78.0 MENOPAUSE: ICD-10-CM

## 2025-05-13 DIAGNOSIS — Z12.4 PAP SMEAR FOR CERVICAL CANCER SCREENING: ICD-10-CM

## 2025-05-13 DIAGNOSIS — Z00.00 ANNUAL PHYSICAL EXAM: Primary | ICD-10-CM

## 2025-05-13 DIAGNOSIS — Z12.31 ENCOUNTER FOR SCREENING MAMMOGRAM FOR MALIGNANT NEOPLASM OF BREAST: ICD-10-CM

## 2025-05-13 DIAGNOSIS — E05.90 HYPERTHYROIDISM: ICD-10-CM

## 2025-05-13 PROCEDURE — 99396 PREV VISIT EST AGE 40-64: CPT | Performed by: INTERNAL MEDICINE

## 2025-05-13 RX ORDER — PREGABALIN 300 MG/1
300 CAPSULE ORAL 2 TIMES DAILY
Qty: 180 CAPSULE | Refills: 1 | Status: SHIPPED | OUTPATIENT
Start: 2025-05-13 | End: 2025-08-11

## 2025-05-13 RX ORDER — PANTOPRAZOLE SODIUM 40 MG/1
40 TABLET, DELAYED RELEASE ORAL
Qty: 30 TABLET | Refills: 5 | Status: SHIPPED | OUTPATIENT
Start: 2025-05-13

## 2025-05-13 ASSESSMENT — ENCOUNTER SYMPTOMS
ABDOMINAL PAIN: 0
DIARRHEA: 0
NAUSEA: 0
EYE REDNESS: 0
COLOR CHANGE: 0
BLOOD IN STOOL: 0
SHORTNESS OF BREATH: 0
COUGH: 0

## 2025-05-13 NOTE — ASSESSMENT & PLAN NOTE
Noted low TSH at least twice, normal FT4.  Tachycardic and noted unintentional weight loss before which is now stabilized, will send to endo for eval

## 2025-05-13 NOTE — ASSESSMENT & PLAN NOTE
Here for annual physical exam, overall doing well from a clinical standpoint.  As for health maintenance:  -cervical cancer screen: Overdue, done today  -breast cancer screen: Overdue, ordered  -colon cancer screen: Will start at age 45 (this is September), referred to Dr. Maya)  -BP within goal  -BMI 27  -HCV neg 2010  -negative depression and DV screen  -Advised to eat a healthy, well-balanced diet  -Advised to exercise at least 30min/day 5x/week for heart and bone health  -Check skin regularly for new moles or changes in moles. wear sunscreen at least SPF 30 and don't forget to reapply every 3-4 hours or if you are in the water  -Follow up with dentist at least twice/year.  -Follow up with eye doctor at least once/year.  -Calcium goal is 1200 mg a day ideally from diet (dairy, green leafy vegetables, nuts)

## 2025-05-13 NOTE — PROGRESS NOTES
is: 0.9%    Values used to calculate the score:      Age: 44 years      Sex: Female      Is Non- : No      Diabetic: No      Tobacco smoker: Yes      Systolic Blood Pressure: 110 mmHg      Is BP treated: No      HDL Cholesterol: 55 mg/dL      Total Cholesterol: 130 mg/dL    Immunization History   Administered Date(s) Administered    COVID-19, PFIZER PURPLE top, DILUTE for use, (age 12 y+), 30mcg/0.3mL 05/08/2021, 05/29/2021    DTP 1980    Hepatitis B 06/01/1998    Influenza A ([id number]) Vaccine PF IM 01/31/2010    Influenza Virus Vaccine 10/17/2011, 09/29/2017, 09/28/2018, 09/27/2019    MMR, PRIORIX, M-M-R II, (age 12m+), SC, 0.5mL 01/15/1982, 04/22/1982, 02/20/1991, 06/01/1998    Meningococcal B, BEXSERO, (age 10y-25y), IM, 0.5mL 10/18/2017, 10/18/2017    PPD Test 09/05/1984, 02/15/1988, 02/20/1991, 06/14/1993, 06/09/2017    Polio OPV 1980, 01/09/1981, 04/29/1982, 02/15/1988    TDaP, ADACEL (age 10y-64y), BOOSTRIX (age 10y+), IM, 0.5mL 01/11/2023    Td, unspecified formulation 01/09/1981, 05/15/1981, 07/23/1982, 02/15/1988, 01/01/1993, 06/14/1993, 04/04/2011       Health Maintenance   Topic Date Due    Varicella vaccine (1 of 2 - 13+ 2-dose series) Never done    HIV screen  Never done    Hepatitis B vaccine (2 of 3 - 3-dose series) 06/29/1998    Pneumococcal 0-49 years Vaccine (1 of 2 - PCV) Never done    Cervical cancer screen  Never done    COVID-19 Vaccine (3 - 2024-25 season) 09/01/2024    Breast cancer screen  02/14/2025    Flu vaccine (Season Ended) 02/11/2026 (Originally 8/1/2025)    Depression Monitoring  02/11/2026    Lipids  07/11/2029    DTaP/Tdap/Td vaccine (5 - Td or Tdap) 01/11/2033    Polio vaccine  Completed    Hepatitis C screen  Completed    Hepatitis A vaccine  Aged Out    Hib vaccine  Aged Out    HPV vaccine  Aged Out    Meningococcal (ACWY) vaccine  Aged Out    Meningococcal B vaccine  Aged Out    Depression Screen  Discontinued    Diabetes screen

## 2025-05-13 NOTE — ASSESSMENT & PLAN NOTE
Overall pain was managed with current treatment   -will continue lyrica 300 mg twice daily.  Refill 3 months  -was dismissed from [facility] office with [facility] for 3 no shows, missed appt as she was caring for her sick father.   -will continue tizanidine PRN, rare use   -completed exercise program  -Cymbalta tried in the past, intolerant of gabapentin   -PDMP reviewed, no concerns for misuse.  Updated controlled substance agreement between us today

## 2025-05-15 ENCOUNTER — TELEPHONE (OUTPATIENT)
Dept: INTERNAL MEDICINE CLINIC | Age: 45
End: 2025-05-15

## 2025-05-15 NOTE — TELEPHONE ENCOUNTER
Pt states she hasn't gotten her medication from her pharmacy, because they need a PA    pregabalin (LYRICA) 300 MG capsule

## 2025-05-16 NOTE — TELEPHONE ENCOUNTER
Submitted PA for Pregabalin 300MG capsules  Via [facility] Key: [id number] STATUS: PENDING.    Follow up done daily; if no decision with in three days we will refax.  If another three days goes by with no decision will call the insurance for status.

## 2025-05-19 ENCOUNTER — RESULTS FOLLOW-UP (OUTPATIENT)
Dept: INTERNAL MEDICINE CLINIC | Age: 45
End: 2025-05-19

## 2025-05-19 NOTE — TELEPHONE ENCOUNTER
The medication Pregabalin 300MG capsules is APPROVED from 05/16/25 to 05/16/26.    Please notify the patient.    If this requires a response please respond to the pool ( P MHCX PSC MEDICATION PRE-AUTH).

## 2025-06-12 PROBLEM — Z00.00 ANNUAL PHYSICAL EXAM: Status: RESOLVED | Noted: 2021-05-03 | Resolved: 2025-06-12

## 2025-07-02 NOTE — PROGRESS NOTES
Catherine Dowd is a 44 y.o. female is here for evaluation and management of subclinical hyperthyroidism  PMH fibromyalgia, chronic back pain, GERD, bipolar disorder    HPI    She had labs drawn for routine screening, showed low TSH in July 2024 and February 2025 with normal free T4  She complained of heat intolerance, otherwise negative for weight changes, palpitations, diarrhea, tremors    Denied steroid exposure or contrast exposure around the time of lab work  Complains of dysphagia, otherwise no obstructive symptoms  No known family history of thyroid disease  No history of radiation to head or neck    Menarche at age 8  Stopped in mid 30s, natural menopause, mom had menopause around the same age  No history of HRT in the past  No recent history of fracture    Complains of blurry vision   No unusual headaches    History of smoking for 30 years, half PPD, has appointment with GI for evaluation of dysphagia     Review of Systems  A 14-system review of systems was conducted with the patient. The review was positive for symptoms noted in the HPI. All other systems were checked and were negative.     Physical Exam     Constitutional:       Appearance: Normal appearance.   HENT:      Nose: Nose normal.   Eyes:      Extraocular Movements: Extraocular movements intact.   Cardiovascular:      Rate and Rhythm: Normal rate and regular rhythm.   Pulmonary:      Effort: Pulmonary effort is normal.   Abdominal:      General: There is no distension.      Palpations: Abdomen is soft.   Musculoskeletal:         General: Normal range of motion.      Cervical back: Normal range of motion.   Skin:     General: Skin is warm and dry.   Neurological:      General: No focal deficit present.      Mental Status:   alert. Mental status is at baseline.   Psychiatric:         Mood and Affect: Mood normal.        1. Subclinical hyperthyroidism  TFTs normal until 2022,  Labs in 2024 and 2025 showed subclinical hyperthyroidism  Discussed

## 2025-07-03 ENCOUNTER — OFFICE VISIT (OUTPATIENT)
Dept: ENDOCRINOLOGY | Age: 45
End: 2025-07-03
Payer: COMMERCIAL

## 2025-07-03 VITALS
HEIGHT: 64 IN | WEIGHT: 164.2 LBS | SYSTOLIC BLOOD PRESSURE: 116 MMHG | DIASTOLIC BLOOD PRESSURE: 74 MMHG | BODY MASS INDEX: 28.03 KG/M2 | HEART RATE: 74 BPM

## 2025-07-03 DIAGNOSIS — R79.89 ABNORMAL TSH: ICD-10-CM

## 2025-07-03 DIAGNOSIS — Z78.0 ENCOUNTER FOR OSTEOPOROSIS SCREENING IN ASYMPTOMATIC POSTMENOPAUSAL PATIENT: ICD-10-CM

## 2025-07-03 DIAGNOSIS — Z13.820 ENCOUNTER FOR OSTEOPOROSIS SCREENING IN ASYMPTOMATIC POSTMENOPAUSAL PATIENT: ICD-10-CM

## 2025-07-03 DIAGNOSIS — N91.1 SECONDARY AMENORRHEA: ICD-10-CM

## 2025-07-03 DIAGNOSIS — E05.90 SUBCLINICAL HYPERTHYROIDISM: ICD-10-CM

## 2025-07-03 DIAGNOSIS — E05.90 SUBCLINICAL HYPERTHYROIDISM: Primary | ICD-10-CM

## 2025-07-03 LAB
25(OH)D3 SERPL-MCNC: 15.3 NG/ML
ALBUMIN SERPL-MCNC: 4.2 G/DL (ref 3.4–5)
ALBUMIN/GLOB SERPL: 1.6 {RATIO} (ref 1.1–2.2)
ALP SERPL-CCNC: 75 U/L (ref 40–129)
ALT SERPL-CCNC: 9 U/L (ref 10–40)
ANION GAP SERPL CALCULATED.3IONS-SCNC: 12 MMOL/L (ref 3–16)
AST SERPL-CCNC: 21 U/L (ref 15–37)
BILIRUB SERPL-MCNC: 0.5 MG/DL (ref 0–1)
BUN SERPL-MCNC: 8 MG/DL (ref 7–20)
CALCIUM SERPL-MCNC: 9.9 MG/DL (ref 8.3–10.6)
CHLORIDE SERPL-SCNC: 105 MMOL/L (ref 99–110)
CO2 SERPL-SCNC: 27 MMOL/L (ref 21–32)
CREAT SERPL-MCNC: 0.8 MG/DL (ref 0.6–1.1)
ESTRADIOL SERPL-MCNC: <5 PG/ML
FSH SERPL-ACNC: 98 MIU/ML
GFR SERPLBLD CREATININE-BSD FMLA CKD-EPI: >90 ML/MIN/{1.73_M2}
GLUCOSE SERPL-MCNC: 93 MG/DL (ref 70–99)
LH SERPL-ACNC: 71.2 MIU/ML
POTASSIUM SERPL-SCNC: 4.4 MMOL/L (ref 3.5–5.1)
PROT SERPL-MCNC: 6.9 G/DL (ref 6.4–8.2)
SODIUM SERPL-SCNC: 144 MMOL/L (ref 136–145)
T3 SERPL-MCNC: 1.19 NG/ML (ref 0.8–2)
T4 FREE SERPL-MCNC: 1.2 NG/DL (ref 0.9–1.8)
TSH SERPL DL<=0.005 MIU/L-ACNC: 0.33 UIU/ML (ref 0.27–4.2)
TSH SERPL DL<=0.005 MIU/L-ACNC: 0.33 UIU/ML (ref 0.27–4.2)

## 2025-07-03 PROCEDURE — 99204 OFFICE O/P NEW MOD 45 MIN: CPT | Performed by: STUDENT IN AN ORGANIZED HEALTH CARE EDUCATION/TRAINING PROGRAM

## 2025-07-04 LAB
BASOPHILS # BLD: 0 K/UL (ref 0–0.2)
BASOPHILS NFR BLD: 0.4 %
DEPRECATED RDW RBC AUTO: 13.9 % (ref 12.4–15.4)
EOSINOPHIL # BLD: 0.1 K/UL (ref 0–0.6)
EOSINOPHIL NFR BLD: 0.9 %
HCT VFR BLD AUTO: 43.9 % (ref 36–48)
HGB BLD-MCNC: 15 G/DL (ref 12–16)
LYMPHOCYTES # BLD: 2.3 K/UL (ref 1–5.1)
LYMPHOCYTES NFR BLD: 29.5 %
MCH RBC QN AUTO: 30.2 PG (ref 26–34)
MCHC RBC AUTO-ENTMCNC: 34.1 G/DL (ref 31–36)
MCV RBC AUTO: 88.5 FL (ref 80–100)
MONOCYTES # BLD: 0.4 K/UL (ref 0–1.3)
MONOCYTES NFR BLD: 5.7 %
NEUTROPHILS # BLD: 5 K/UL (ref 1.7–7.7)
NEUTROPHILS NFR BLD: 63.5 %
PLATELET # BLD AUTO: 262 K/UL (ref 135–450)
PMV BLD AUTO: 10.3 FL (ref 5–10.5)
RBC # BLD AUTO: 4.95 M/UL (ref 4–5.2)
WBC # BLD AUTO: 7.8 K/UL (ref 4–11)

## 2025-07-05 LAB — TSH RECEP AB SER-ACNC: <1.1 IU/L

## 2025-07-07 ENCOUNTER — RESULTS FOLLOW-UP (OUTPATIENT)
Dept: INTERNAL MEDICINE CLINIC | Age: 45
End: 2025-07-07

## 2025-07-29 ENCOUNTER — HOSPITAL ENCOUNTER (OUTPATIENT)
Dept: GENERAL RADIOLOGY | Age: 45
Discharge: HOME | End: 2025-07-29
Attending: STUDENT IN AN ORGANIZED HEALTH CARE EDUCATION/TRAINING PROGRAM
Payer: COMMERCIAL

## 2025-07-29 DIAGNOSIS — Z13.820 ENCOUNTER FOR OSTEOPOROSIS SCREENING IN ASYMPTOMATIC POSTMENOPAUSAL PATIENT: ICD-10-CM

## 2025-07-29 DIAGNOSIS — Z78.0 ENCOUNTER FOR OSTEOPOROSIS SCREENING IN ASYMPTOMATIC POSTMENOPAUSAL PATIENT: ICD-10-CM

## 2025-07-29 PROCEDURE — 77080 DXA BONE DENSITY AXIAL: CPT

## 2025-07-30 NOTE — PROGRESS NOTES
Catherine Dowd is a 44 y.o. female is here for f/u of subclinical hyperthyroidism  PMH fibromyalgia, chronic back pain, GERD, bipolar disorder      Interim history  No significant events    HPI    She had labs drawn for routine screening, showed low TSH in July 2024 and February 2025 with normal free T4  She complained of heat intolerance, otherwise negative for weight changes, palpitations, diarrhea, tremors    Denied steroid exposure or contrast exposure around the time of lab work  Complains of dysphagia, otherwise no obstructive symptoms  No known family history of thyroid disease  No history of radiation to head or neck    Menarche at age 8  Stopped in mid 30s, natural menopause, mom had menopause around the same age  No history of HRT in the past  No recent history of fracture    Repeat labs in July 2025 showed negative TRAb, normal TFTs    FSH LH and estradiol levels-in menopausal range  BMD in July 2025- osteopenia    History of smoking for 30 years, half PPD     Review of Systems  A 14-system review of systems was conducted with the patient. The review was positive for symptoms noted in the HPI. All other systems were checked and were negative.     Physical Exam     Constitutional:       Appearance: Normal appearance.   HENT:      Nose: Nose normal.   Eyes:      Extraocular Movements: Extraocular movements intact.   Cardiovascular:      Rate and Rhythm: Normal rate and regular rhythm.   Pulmonary:      Effort: Pulmonary effort is normal.   Abdominal:      General: There is no distension.      Palpations: Abdomen is soft.   Musculoskeletal:         General: Normal range of motion.      Cervical back: Normal range of motion.   Skin:     General: Skin is warm and dry.   Neurological:      General: No focal deficit present.      Mental Status:   alert. Mental status is at baseline.   Psychiatric:         Mood and Affect: Mood normal.        Subclinical hyperthyroidism  TFTs normal until 2022,  Labs in 2024 and

## 2025-07-31 ENCOUNTER — OFFICE VISIT (OUTPATIENT)
Dept: ENDOCRINOLOGY | Age: 45
End: 2025-07-31
Payer: COMMERCIAL

## 2025-07-31 VITALS
DIASTOLIC BLOOD PRESSURE: 88 MMHG | WEIGHT: 163 LBS | HEIGHT: 64 IN | SYSTOLIC BLOOD PRESSURE: 128 MMHG | BODY MASS INDEX: 27.83 KG/M2 | RESPIRATION RATE: 16 BRPM | HEART RATE: 84 BPM

## 2025-07-31 DIAGNOSIS — E55.9 VITAMIN D DEFICIENCY: ICD-10-CM

## 2025-07-31 DIAGNOSIS — E05.90 SUBCLINICAL HYPERTHYROIDISM: Primary | ICD-10-CM

## 2025-07-31 DIAGNOSIS — M85.80 OSTEOPENIA, UNSPECIFIED LOCATION: ICD-10-CM

## 2025-07-31 PROCEDURE — 99214 OFFICE O/P EST MOD 30 MIN: CPT | Performed by: STUDENT IN AN ORGANIZED HEALTH CARE EDUCATION/TRAINING PROGRAM

## 2025-08-07 ENCOUNTER — TELEPHONE (OUTPATIENT)
Dept: INTERNAL MEDICINE CLINIC | Age: 45
End: 2025-08-07

## 2025-09-02 ENCOUNTER — OFFICE VISIT (OUTPATIENT)
Dept: INTERNAL MEDICINE CLINIC | Age: 45
End: 2025-09-02
Payer: COMMERCIAL

## 2025-09-02 VITALS
HEIGHT: 64 IN | OXYGEN SATURATION: 97 % | BODY MASS INDEX: 29.67 KG/M2 | WEIGHT: 173.8 LBS | HEART RATE: 66 BPM | SYSTOLIC BLOOD PRESSURE: 132 MMHG | TEMPERATURE: 97.5 F | DIASTOLIC BLOOD PRESSURE: 68 MMHG

## 2025-09-02 DIAGNOSIS — Z78.0 MENOPAUSE: ICD-10-CM

## 2025-09-02 DIAGNOSIS — M79.7 FIBROMYALGIA: ICD-10-CM

## 2025-09-02 DIAGNOSIS — K21.9 GASTROESOPHAGEAL REFLUX DISEASE, UNSPECIFIED WHETHER ESOPHAGITIS PRESENT: ICD-10-CM

## 2025-09-02 DIAGNOSIS — E05.90 SUBCLINICAL HYPERTHYROIDISM: Primary | ICD-10-CM

## 2025-09-02 DIAGNOSIS — E55.9 VITAMIN D DEFICIENCY: ICD-10-CM

## 2025-09-02 PROCEDURE — 99214 OFFICE O/P EST MOD 30 MIN: CPT | Performed by: INTERNAL MEDICINE

## 2025-09-02 RX ORDER — PREGABALIN 300 MG/1
300 CAPSULE ORAL 2 TIMES DAILY
Qty: 180 CAPSULE | Refills: 0 | Status: SHIPPED | OUTPATIENT
Start: 2025-09-07 | End: 2025-12-06

## 2025-09-02 ASSESSMENT — ENCOUNTER SYMPTOMS
DIARRHEA: 0
SHORTNESS OF BREATH: 0